# Patient Record
Sex: MALE | Race: WHITE | Employment: OTHER | URBAN - METROPOLITAN AREA
[De-identification: names, ages, dates, MRNs, and addresses within clinical notes are randomized per-mention and may not be internally consistent; named-entity substitution may affect disease eponyms.]

---

## 2017-03-17 ENCOUNTER — OFFICE VISIT (OUTPATIENT)
Dept: UROLOGY | Age: 67
End: 2017-03-17

## 2017-03-17 VITALS
DIASTOLIC BLOOD PRESSURE: 80 MMHG | BODY MASS INDEX: 24.25 KG/M2 | HEIGHT: 68 IN | SYSTOLIC BLOOD PRESSURE: 132 MMHG | HEART RATE: 62 BPM | WEIGHT: 160 LBS

## 2017-03-17 DIAGNOSIS — N40.0 BENIGN NON-NODULAR PROSTATIC HYPERPLASIA WITHOUT LOWER URINARY TRACT SYMPTOMS: ICD-10-CM

## 2017-03-17 DIAGNOSIS — R97.20 ELEVATED PSA: Primary | ICD-10-CM

## 2017-03-17 PROCEDURE — 99213 OFFICE O/P EST LOW 20 MIN: CPT | Performed by: UROLOGY

## 2017-03-17 ASSESSMENT — ENCOUNTER SYMPTOMS
ABDOMINAL PAIN: 0
SHORTNESS OF BREATH: 0
ABDOMINAL DISTENTION: 0

## 2017-09-25 DIAGNOSIS — R97.20 ELEVATED PSA: ICD-10-CM

## 2017-09-25 LAB — PROSTATE SPECIFIC ANTIGEN: 5.63 NG/ML (ref 0–5.4)

## 2017-09-28 ENCOUNTER — OFFICE VISIT (OUTPATIENT)
Dept: UROLOGY | Age: 67
End: 2017-09-28

## 2017-09-28 VITALS
BODY MASS INDEX: 24.25 KG/M2 | HEART RATE: 63 BPM | WEIGHT: 160 LBS | SYSTOLIC BLOOD PRESSURE: 136 MMHG | DIASTOLIC BLOOD PRESSURE: 84 MMHG | HEIGHT: 68 IN

## 2017-09-28 DIAGNOSIS — N13.8 BPH WITH OBSTRUCTION/LOWER URINARY TRACT SYMPTOMS: ICD-10-CM

## 2017-09-28 DIAGNOSIS — N40.1 BPH WITH OBSTRUCTION/LOWER URINARY TRACT SYMPTOMS: ICD-10-CM

## 2017-09-28 DIAGNOSIS — R97.20 ELEVATED PSA: Primary | ICD-10-CM

## 2017-09-28 PROCEDURE — 99213 OFFICE O/P EST LOW 20 MIN: CPT | Performed by: UROLOGY

## 2017-09-28 ASSESSMENT — ENCOUNTER SYMPTOMS: SHORTNESS OF BREATH: 0

## 2017-12-27 ENCOUNTER — OFFICE VISIT (OUTPATIENT)
Dept: FAMILY MEDICINE CLINIC | Age: 67
End: 2017-12-27

## 2017-12-27 VITALS
TEMPERATURE: 97.8 F | DIASTOLIC BLOOD PRESSURE: 80 MMHG | RESPIRATION RATE: 12 BRPM | SYSTOLIC BLOOD PRESSURE: 140 MMHG | HEART RATE: 80 BPM | WEIGHT: 169 LBS | BODY MASS INDEX: 25.61 KG/M2 | HEIGHT: 68 IN

## 2017-12-27 DIAGNOSIS — J01.90 ACUTE NON-RECURRENT SINUSITIS, UNSPECIFIED LOCATION: Primary | ICD-10-CM

## 2017-12-27 PROCEDURE — 99213 OFFICE O/P EST LOW 20 MIN: CPT | Performed by: FAMILY MEDICINE

## 2017-12-27 RX ORDER — DOXYCYCLINE HYCLATE 100 MG
100 TABLET ORAL 2 TIMES DAILY
Qty: 30 TABLET | Refills: 0 | Status: SHIPPED | OUTPATIENT
Start: 2017-12-27 | End: 2018-01-11

## 2017-12-27 ASSESSMENT — PATIENT HEALTH QUESTIONNAIRE - PHQ9
SUM OF ALL RESPONSES TO PHQ9 QUESTIONS 1 & 2: 0
1. LITTLE INTEREST OR PLEASURE IN DOING THINGS: 0
2. FEELING DOWN, DEPRESSED OR HOPELESS: 0
SUM OF ALL RESPONSES TO PHQ QUESTIONS 1-9: 0

## 2017-12-27 ASSESSMENT — ENCOUNTER SYMPTOMS
HEMOPTYSIS: 0
SHORTNESS OF BREATH: 0
SINUS PRESSURE: 1
RHINORRHEA: 1
SORE THROAT: 1
GASTROINTESTINAL NEGATIVE: 1
WHEEZING: 0
COUGH: 1
HEARTBURN: 0

## 2017-12-27 NOTE — PROGRESS NOTES
Subjective  Boaz Hodges, 78 y.o. male presents today with:  Chief Complaint   Patient presents with    URI     x 3 days , also pt complains of lump on left forearm     Mass       Cough   This is a new problem. The current episode started in the past 7 days. The problem has been unchanged. The problem occurs constantly. The cough is productive of sputum. Associated symptoms include ear congestion, headaches, myalgias, nasal congestion, postnasal drip, rhinorrhea and a sore throat. Pertinent negatives include no chest pain, chills, ear pain, fever, heartburn, hemoptysis, rash, shortness of breath, sweats, weight loss or wheezing. Nothing aggravates the symptoms. He has tried nothing for the symptoms. The treatment provided no relief. There is no history of asthma, bronchiectasis, bronchitis, COPD, emphysema, environmental allergies or pneumonia. Review of Systems   Constitutional: Negative. Negative for chills, fever and weight loss. HENT: Positive for congestion, postnasal drip, rhinorrhea, sinus pressure and sore throat. Negative for ear pain. Respiratory: Positive for cough. Negative for hemoptysis, shortness of breath and wheezing. Cardiovascular: Negative for chest pain. Gastrointestinal: Negative. Negative for heartburn. Genitourinary: Negative. Musculoskeletal: Positive for myalgias. Skin: Negative for rash. Allergic/Immunologic: Negative for environmental allergies. Neurological: Positive for headaches. Psychiatric/Behavioral: Negative. No past medical history on file. Past Surgical History:   Procedure Laterality Date    PROSTATE BIOPSY      x2     Social History     Social History    Marital status:      Spouse name: N/A    Number of children: N/A    Years of education: N/A     Occupational History    Not on file.      Social History Main Topics    Smoking status: Never Smoker    Smokeless tobacco: Never Used    Alcohol use 0.0 oz/week    Drug use:

## 2018-04-09 DIAGNOSIS — R97.20 ELEVATED PSA: ICD-10-CM

## 2018-04-09 LAB — PROSTATE SPECIFIC ANTIGEN: 5.33 NG/ML (ref 0–5.4)

## 2018-04-10 ENCOUNTER — OFFICE VISIT (OUTPATIENT)
Dept: UROLOGY | Age: 68
End: 2018-04-10
Payer: MEDICARE

## 2018-04-10 VITALS
SYSTOLIC BLOOD PRESSURE: 130 MMHG | HEART RATE: 64 BPM | BODY MASS INDEX: 24.25 KG/M2 | WEIGHT: 160 LBS | HEIGHT: 68 IN | DIASTOLIC BLOOD PRESSURE: 80 MMHG

## 2018-04-10 DIAGNOSIS — N40.0 BPH WITHOUT OBSTRUCTION/LOWER URINARY TRACT SYMPTOMS: ICD-10-CM

## 2018-04-10 DIAGNOSIS — R97.20 ELEVATED PSA: Primary | ICD-10-CM

## 2018-04-10 PROCEDURE — 99213 OFFICE O/P EST LOW 20 MIN: CPT | Performed by: UROLOGY

## 2018-04-10 ASSESSMENT — ENCOUNTER SYMPTOMS
ABDOMINAL PAIN: 0
ABDOMINAL DISTENTION: 0

## 2018-08-14 ENCOUNTER — OFFICE VISIT (OUTPATIENT)
Dept: FAMILY MEDICINE CLINIC | Age: 68
End: 2018-08-14
Payer: MEDICARE

## 2018-08-14 VITALS
WEIGHT: 164 LBS | BODY MASS INDEX: 24.86 KG/M2 | RESPIRATION RATE: 16 BRPM | DIASTOLIC BLOOD PRESSURE: 68 MMHG | HEIGHT: 68 IN | TEMPERATURE: 98 F | SYSTOLIC BLOOD PRESSURE: 122 MMHG | HEART RATE: 69 BPM | OXYGEN SATURATION: 98 %

## 2018-08-14 DIAGNOSIS — B96.89 ACUTE BACTERIAL SINUSITIS: Primary | ICD-10-CM

## 2018-08-14 DIAGNOSIS — J01.90 ACUTE BACTERIAL SINUSITIS: Primary | ICD-10-CM

## 2018-08-14 PROCEDURE — 99213 OFFICE O/P EST LOW 20 MIN: CPT | Performed by: INTERNAL MEDICINE

## 2018-08-14 RX ORDER — FLUTICASONE PROPIONATE 50 MCG
1 SPRAY, SUSPENSION (ML) NASAL DAILY
Qty: 1 BOTTLE | Refills: 0 | Status: SHIPPED | OUTPATIENT
Start: 2018-08-14 | End: 2019-05-03 | Stop reason: ALTCHOICE

## 2018-08-14 RX ORDER — AMOXICILLIN AND CLAVULANATE POTASSIUM 875; 125 MG/1; MG/1
1 TABLET, FILM COATED ORAL 2 TIMES DAILY
Qty: 14 TABLET | Refills: 0 | Status: SHIPPED | OUTPATIENT
Start: 2018-08-14 | End: 2018-08-21

## 2018-08-14 ASSESSMENT — ENCOUNTER SYMPTOMS
SINUS PAIN: 1
EYE PAIN: 0
SHORTNESS OF BREATH: 0
BACK PAIN: 0
ABDOMINAL PAIN: 0
SINUS PRESSURE: 1
COUGH: 1

## 2018-08-14 NOTE — PROGRESS NOTES
Subjective:      Patient ID: Ramonita Daniels is a 78 y.o. male who presents today with:  Chief Complaint   Patient presents with    Sinus Problem     sinus issues since mid June and sinus headache    Congestion     congestion        HPI    2 Month history of \"stuffy\" nose, light headed, dry cough, can't breath out of nostrils. He mentions he swims a lot. Also has sinus pain and pressure. He hasn't been treated at all up to this point. Mild/annoying symptoms. Non productive cough. History reviewed. No pertinent past medical history. Past Surgical History:   Procedure Laterality Date    PROSTATE BIOPSY      x2     Social History     Social History    Marital status:      Spouse name: N/A    Number of children: N/A    Years of education: N/A     Occupational History    Not on file. Social History Main Topics    Smoking status: Never Smoker    Smokeless tobacco: Never Used    Alcohol use 0.0 oz/week    Drug use: No    Sexual activity: Not on file     Other Topics Concern    Not on file     Social History Narrative    No narrative on file     Allergies   Allergen Reactions    Bactrim [Sulfamethoxazole-Trimethoprim] Hives     Patient stated got hives, itching after taking Bactrim     No current outpatient prescriptions on file prior to visit. No current facility-administered medications on file prior to visit. I have personally reviewed the ROS, PMH, PFH, and social history     Review of Systems   Constitutional: Negative for chills. HENT: Positive for sinus pain and sinus pressure. Negative for congestion. Eyes: Negative for pain. Respiratory: Positive for cough (Nonproductive ). Negative for shortness of breath. Cardiovascular: Negative for chest pain. Gastrointestinal: Negative for abdominal pain. Genitourinary: Negative for hematuria. Musculoskeletal: Negative for back pain. Allergic/Immunologic: Negative for immunocompromised state.    Neurological: Negative for headaches. Psychiatric/Behavioral: Negative for hallucinations. Objective:   /68 (Site: Left Arm, Position: Sitting, Cuff Size: Large Adult)   Pulse 69   Temp 98 °F (36.7 °C) (Tympanic)   Resp 16   Ht 5' 8\" (1.727 m)   Wt 164 lb (74.4 kg)   SpO2 98%   BMI 24.94 kg/m²     Physical Exam   Constitutional: He is oriented to person, place, and time. He appears well-developed and well-nourished. HENT:   Head: Normocephalic. Right Ear: External ear normal.   Left Ear: External ear normal.   No facial edema    Eyes: Pupils are equal, round, and reactive to light. Neck: No tracheal deviation present. Cardiovascular: Normal rate, regular rhythm and normal heart sounds. Exam reveals no gallop and no friction rub. No murmur heard. Pulmonary/Chest: No respiratory distress. He has no rales. Abdominal: Soft. Bowel sounds are normal. He exhibits no distension. There is no rebound. Musculoskeletal: He exhibits no edema. Neurological: He is oriented to person, place, and time. Skin: Skin is warm and dry. Assessment:       Diagnosis Orders   1. Acute bacterial sinusitis  amoxicillin-clavulanate (AUGMENTIN) 875-125 MG per tablet    fluticasone (FLONASE) 50 MCG/ACT nasal spray           Plan:   Orders per below  Take mucinex and saline spray as well, if this fails add Netipot   If not better after treatment follow back up. Explained risk of worsening infection, and if it should progress despite antibiotics to call 911 immediately. Explained risk of sepsis, amputation, death, etc.     The patient was reminded that an antibiotic has been prescribed the predicted course is improvement to cure with no persistent issues. Take antibiotics as directed. If any problems occur, an appointment should be made or ER visit if severe. Because of the risk with ANY antibiotic of C. Difficile colitis if persistent diarrhea or abdominal pain or any concerning symptoms, we should be notified.   To reduce

## 2018-08-14 NOTE — PATIENT INSTRUCTIONS

## 2018-10-22 ENCOUNTER — TELEPHONE (OUTPATIENT)
Dept: UROLOGY | Age: 68
End: 2018-10-22

## 2018-10-22 DIAGNOSIS — R97.20 ELEVATED PSA: Primary | ICD-10-CM

## 2018-10-23 DIAGNOSIS — R97.20 ELEVATED PSA: ICD-10-CM

## 2018-10-23 LAB — PROSTATE SPECIFIC ANTIGEN: 4.91 NG/ML (ref 0–5.4)

## 2018-10-25 ENCOUNTER — OFFICE VISIT (OUTPATIENT)
Dept: UROLOGY | Age: 68
End: 2018-10-25
Payer: MEDICARE

## 2018-10-25 VITALS
DIASTOLIC BLOOD PRESSURE: 72 MMHG | HEIGHT: 68 IN | BODY MASS INDEX: 24.25 KG/M2 | WEIGHT: 160 LBS | HEART RATE: 79 BPM | SYSTOLIC BLOOD PRESSURE: 132 MMHG

## 2018-10-25 DIAGNOSIS — R97.20 ELEVATED PSA: Primary | ICD-10-CM

## 2018-10-25 PROCEDURE — 99213 OFFICE O/P EST LOW 20 MIN: CPT | Performed by: UROLOGY

## 2018-10-25 ASSESSMENT — ENCOUNTER SYMPTOMS: SHORTNESS OF BREATH: 0

## 2018-10-25 NOTE — PROGRESS NOTES
Subjective:      Patient ID: Yadira Kim is a 80 y.o. male. HPI This is a 77 yo white male with h/o BPH without LUTs and elevated PSA's and prior episodes of prostatitis back in follow-up. Since last seen on 4/10/18, he denies interval prostatitis (used Levaquin in past). He has no hematuria or dysuria and no frequency or urgency or UI. He tried Flomax in the past and got dizzy and so stopped the medication. He has no interval medical or surgical problems. I reviewed the interval PSA today.      Trus/Prostate biopsy 6/2012: neg, PV 31.5 cc  (PSA 7.19 ng/ml)  Trus/Prostate biopsy 11/2010: one focus of HGPIN, others neg   PCA3: neg, (10.7)    History reviewed. No pertinent past medical history. Past Surgical History:   Procedure Laterality Date    PROSTATE BIOPSY      x2     Social History     Social History    Marital status:      Spouse name: N/A    Number of children: N/A    Years of education: N/A     Social History Main Topics    Smoking status: Never Smoker    Smokeless tobacco: Never Used    Alcohol use 0.0 oz/week    Drug use: No    Sexual activity: Not Asked     Other Topics Concern    None     Social History Narrative    None     Family History   Problem Relation Age of Onset    High Blood Pressure Mother     Cancer Mother         ovarian    High Blood Pressure Father     Emphysema Father     Cancer Father         prostate     Current Outpatient Prescriptions   Medication Sig Dispense Refill    fluticasone (FLONASE) 50 MCG/ACT nasal spray 1 spray by Nasal route daily 1 Bottle 0     No current facility-administered medications for this visit. Bactrim [sulfamethoxazole-trimethoprim]  reviewed      Review of Systems   Constitutional: Negative for fever and unexpected weight change. Respiratory: Negative for shortness of breath. Cardiovascular: Negative for chest pain. Genitourinary: Negative for difficulty urinating, dysuria, flank pain and hematuria.        Objective: Physical Exam   Constitutional: He appears well-developed and well-nourished. Genitourinary: Rectal exam shows no external hemorrhoid. Prostate is enlarged. Prostate is not tender. Genitourinary Comments: Prostate has no nodules   Neurological: He is alert. Psychiatric: He has a normal mood and affect. His behavior is normal.     PSA   Date Value Ref Range Status   10/23/2018 4.91 0.00 - 5.40 ng/mL Final   04/09/2018 5.33 0.00 - 5.40 ng/mL Final   09/25/2017 5.63 (H) 0.00 - 5.40 ng/mL Final   03/10/2017 5.50 (H) 0.00 - 5.40 ng/mL Corrected   09/01/2016 5.54 (H) 0.00 - 5.40 ng/mL Final     Diagnostic Psa   Date Value Ref Range Status   07/30/2014 4.19 0.00 - 5.40 ng/mL Final   07/05/2013 4.3 (H) 0.0 - 4.0 ng/mL Final       Assessment: This is a 79 yo white male with h/o BPH with mild LUTs (stable) and h/o elevated PSA's (2 prior biopsies negative and PCA3 normal) and with an elevated but relatively stable PSA. The option of another prostate biopsy vs continued closer PSA observation was again discussed. I also discussed and recommended he consider a prostate MRI and if abnl a fusion biopsy and he agrees. and he will continue with closer PSA follow-up. He understands the approx 25% risk for prostate cancer based on his current PSA. Plan:      1. F/U 6 mo for PSA  2.  Refer to [facility] for prostate MRI        Hali Esteves MD

## 2019-01-29 ENCOUNTER — OFFICE VISIT (OUTPATIENT)
Dept: FAMILY MEDICINE CLINIC | Age: 69
End: 2019-01-29
Payer: MEDICARE

## 2019-01-29 VITALS
DIASTOLIC BLOOD PRESSURE: 70 MMHG | HEART RATE: 74 BPM | OXYGEN SATURATION: 98 % | TEMPERATURE: 98.5 F | SYSTOLIC BLOOD PRESSURE: 142 MMHG | WEIGHT: 165 LBS | RESPIRATION RATE: 12 BRPM | HEIGHT: 68 IN | BODY MASS INDEX: 25.01 KG/M2

## 2019-01-29 DIAGNOSIS — M79.662 PAIN OF LEFT CALF: ICD-10-CM

## 2019-01-29 DIAGNOSIS — J01.90 ACUTE NON-RECURRENT SINUSITIS, UNSPECIFIED LOCATION: Primary | ICD-10-CM

## 2019-01-29 PROCEDURE — 3288F FALL RISK ASSESSMENT DOCD: CPT | Performed by: FAMILY MEDICINE

## 2019-01-29 PROCEDURE — 99213 OFFICE O/P EST LOW 20 MIN: CPT | Performed by: FAMILY MEDICINE

## 2019-01-29 PROCEDURE — G8510 SCR DEP NEG, NO PLAN REQD: HCPCS | Performed by: FAMILY MEDICINE

## 2019-01-29 RX ORDER — DOXYCYCLINE HYCLATE 100 MG
100 TABLET ORAL 2 TIMES DAILY
Qty: 20 TABLET | Refills: 0 | Status: SHIPPED | OUTPATIENT
Start: 2019-01-29 | End: 2019-02-08

## 2019-01-29 ASSESSMENT — ENCOUNTER SYMPTOMS
SWOLLEN GLANDS: 1
CHANGE IN BOWEL HABIT: 0
COUGH: 0
SORE THROAT: 1
VISUAL CHANGE: 0
RHINORRHEA: 1
RESPIRATORY NEGATIVE: 1
VOMITING: 0
NAUSEA: 0
GASTROINTESTINAL NEGATIVE: 1
SINUS PRESSURE: 1
VOICE CHANGE: 1
ABDOMINAL PAIN: 0

## 2019-01-29 ASSESSMENT — PATIENT HEALTH QUESTIONNAIRE - PHQ9
1. LITTLE INTEREST OR PLEASURE IN DOING THINGS: 0
SUM OF ALL RESPONSES TO PHQ9 QUESTIONS 1 & 2: 0
DEPRESSION UNABLE TO ASSESS: FUNCTIONAL CAPACITY MOTIVATION LIMITS ACCURACY
SUM OF ALL RESPONSES TO PHQ QUESTIONS 1-9: 0
2. FEELING DOWN, DEPRESSED OR HOPELESS: 0
SUM OF ALL RESPONSES TO PHQ QUESTIONS 1-9: 0

## 2019-01-30 ENCOUNTER — HOSPITAL ENCOUNTER (OUTPATIENT)
Dept: ULTRASOUND IMAGING | Age: 69
Discharge: HOME | End: 2019-02-01
Payer: MEDICARE

## 2019-01-30 DIAGNOSIS — M79.662 PAIN OF LEFT CALF: ICD-10-CM

## 2019-01-30 PROCEDURE — 93971 EXTREMITY STUDY: CPT

## 2019-03-27 ENCOUNTER — TELEPHONE (OUTPATIENT)
Dept: FAMILY MEDICINE CLINIC | Age: 69
End: 2019-03-27

## 2019-04-29 DIAGNOSIS — R97.20 ELEVATED PSA: Primary | ICD-10-CM

## 2019-04-29 DIAGNOSIS — R97.20 ELEVATED PSA: ICD-10-CM

## 2019-04-29 LAB — PROSTATE SPECIFIC ANTIGEN: 6.91 NG/ML (ref 0–5.4)

## 2019-05-03 ENCOUNTER — OFFICE VISIT (OUTPATIENT)
Dept: UROLOGY | Age: 69
End: 2019-05-03
Payer: MEDICARE

## 2019-05-03 VITALS
WEIGHT: 162 LBS | BODY MASS INDEX: 24.55 KG/M2 | SYSTOLIC BLOOD PRESSURE: 138 MMHG | HEART RATE: 62 BPM | HEIGHT: 68 IN | DIASTOLIC BLOOD PRESSURE: 84 MMHG

## 2019-05-03 DIAGNOSIS — R97.20 ELEVATED PSA: Primary | ICD-10-CM

## 2019-05-03 PROCEDURE — 99213 OFFICE O/P EST LOW 20 MIN: CPT | Performed by: UROLOGY

## 2019-05-03 RX ORDER — ASCORBIC ACID 500 MG
500 TABLET ORAL DAILY
COMMUNITY

## 2019-05-03 ASSESSMENT — ENCOUNTER SYMPTOMS
ABDOMINAL PAIN: 0
ABDOMINAL DISTENTION: 0

## 2019-05-03 NOTE — PROGRESS NOTES
Subjective:      Patient ID: Dee Dee Lua is a 80 y.o. male. HPI This is a 79 yo white male with h/o BPH without LUTs and elevated PSA's and prior prostatitis back in follow-up. Since last seen on 10/25/18, he was seen at [facility] and had a \"negative\" prostate MRI and was told he did not need a biopsy. Since last seen, he denies interval prostatitis (used Levaquin in past). He has no hematuria or dysuria. He has no frequency or urgency or UI. He tried Flomax in the past and got dizzy and so stopped the medication. He has no interval medical or surgical problems. I reviewed the interval PSA today. I reviewed the interval medical records from [facility] today.      Trus/Prostate biopsy 6/2012: neg, PV 31.5 cc  (PSA 7.19 ng/ml)  Trus/Prostate biopsy 11/2010: one focus of HGPIN, others neg   PCA3: neg, (10.7)    History reviewed. No pertinent past medical history. Past Surgical History:   Procedure Laterality Date    PROSTATE BIOPSY      x2     Social History     Socioeconomic History    Marital status:      Spouse name: None    Number of children: None    Years of education: None    Highest education level: None   Occupational History    None   Social Needs    Financial resource strain: None    Food insecurity:     Worry: None     Inability: None    Transportation needs:     Medical: None     Non-medical: None   Tobacco Use    Smoking status: Never Smoker    Smokeless tobacco: Never Used   Substance and Sexual Activity    Alcohol use:  Yes     Alcohol/week: 0.0 oz    Drug use: No    Sexual activity: None   Lifestyle    Physical activity:     Days per week: None     Minutes per session: None    Stress: None   Relationships    Social connections:     Talks on phone: None     Gets together: None     Attends [employer] service: None     Active member of club or organization: None     Attends meetings of clubs or organizations: None     Relationship status: None    Intimate partner violence:     Fear of current on his current PSA. Plan:      1.  F/U 6 mo for PSA (pt choice)        Haritha Drake MD

## 2019-07-11 ENCOUNTER — OFFICE VISIT (OUTPATIENT)
Dept: FAMILY MEDICINE CLINIC | Age: 69
End: 2019-07-11
Payer: MEDICARE

## 2019-07-11 VITALS
OXYGEN SATURATION: 98 % | RESPIRATION RATE: 15 BRPM | SYSTOLIC BLOOD PRESSURE: 136 MMHG | TEMPERATURE: 98.7 F | HEART RATE: 82 BPM | HEIGHT: 68 IN | DIASTOLIC BLOOD PRESSURE: 84 MMHG | BODY MASS INDEX: 24.1 KG/M2 | WEIGHT: 159 LBS

## 2019-07-11 DIAGNOSIS — E78.5 HYPERLIPIDEMIA, UNSPECIFIED HYPERLIPIDEMIA TYPE: ICD-10-CM

## 2019-07-11 DIAGNOSIS — K11.5 SIALOLITHIASIS: Primary | ICD-10-CM

## 2019-07-11 DIAGNOSIS — R97.20 ELEVATED PSA: ICD-10-CM

## 2019-07-11 DIAGNOSIS — R13.10 DYSPHAGIA, UNSPECIFIED TYPE: ICD-10-CM

## 2019-07-11 DIAGNOSIS — R03.0 ELEVATED BP WITHOUT DIAGNOSIS OF HYPERTENSION: ICD-10-CM

## 2019-07-11 PROCEDURE — 99214 OFFICE O/P EST MOD 30 MIN: CPT | Performed by: INTERNAL MEDICINE

## 2019-07-11 RX ORDER — AMOXICILLIN AND CLAVULANATE POTASSIUM 875; 125 MG/1; MG/1
1 TABLET, FILM COATED ORAL 2 TIMES DAILY
Qty: 14 TABLET | Refills: 0 | Status: SHIPPED | OUTPATIENT
Start: 2019-07-11 | End: 2019-07-18

## 2019-07-11 ASSESSMENT — ENCOUNTER SYMPTOMS
BACK PAIN: 0
ABDOMINAL PAIN: 0
EYE PAIN: 0
SHORTNESS OF BREATH: 0

## 2019-07-11 NOTE — PATIENT INSTRUCTIONS
Patient Education        Salivary Gland Stone: Care Instructions  Your Care Instructions    Salivary glands make saliva, or spit. A salivary gland stone is a piece of hard material, usually calcium, that can form in any of the three main salivary glands in the mouth. Salivary gland stones are also called salivary duct stones. Stones form most often in the gland that releases saliva below the tongue. A stone can block saliva from flowing out of the gland. When saliva backs up behind the stone, it can make the gland swell. The gland swells while you are eating, and then the swelling goes down slowly afterward. The swelling and pain may be under the jaw or in the area in front of the ear, depending on which gland is affected. Your doctor will ask you about your symptoms. He or she may be able to see and feel the gland under your skin or in the floor of your mouth. You may get an imaging test, such as a CT scan or ultrasound. This will help your doctor know if you have a stone and not some other problem. Most stones come out into the mouth on their own. While the stone is in the gland, your doctor may have you take medicine for pain. There are also some things you can do at home to help move the stone. If the stone in your gland hasn't come out within a few weeks, your doctor will discuss treatment options with you. Follow-up care is a key part of your treatment and safety. Be sure to make and go to all appointments, and call your doctor if you are having problems. It's also a good idea to know your test results and keep a list of the medicines you take. How can you care for yourself at home? · Be safe with medicines. Read and follow all instructions on the label. ? If the doctor gave you a prescription medicine for pain, take it as prescribed. ? If you are not taking a prescription pain medicine, ask your doctor if you can take an over-the-counter medicine.   · If your doctor prescribed antibiotics, take them

## 2019-07-11 NOTE — PROGRESS NOTES
of clubs or organizations: Not on file     Relationship status: Not on file    Intimate partner violence:     Fear of current or ex partner: Not on file     Emotionally abused: Not on file     Physically abused: Not on file     Forced sexual activity: Not on file   Other Topics Concern    Not on file   Social History Narrative    Not on file     Allergies   Allergen Reactions    Bactrim [Sulfamethoxazole-Trimethoprim] Hives     Patient stated got hives, itching after taking Bactrim     Current Outpatient Medications on File Prior to Visit   Medication Sig Dispense Refill    vitamin C (ASCORBIC ACID) 500 MG tablet Take 500 mg by mouth daily       No current facility-administered medications on file prior to visit. I have personally reviewed the ROS, PMH, PFH, and social history     Review of Systems   Constitutional: Negative for chills. HENT: Negative for congestion. Area in left side of jaw is hard to touch    Eyes: Negative for pain. Respiratory: Negative for shortness of breath. Cardiovascular: Negative for chest pain. Gastrointestinal: Negative for abdominal pain. Genitourinary: Negative for hematuria. Musculoskeletal: Negative for back pain. Allergic/Immunologic: Negative for immunocompromised state. Neurological: Negative for headaches. Psychiatric/Behavioral: Negative for hallucinations. Objective:   /84 (Site: Right Upper Arm, Position: Sitting, Cuff Size: Large Adult)   Pulse 82   Temp 98.7 °F (37.1 °C) (Tympanic)   Resp 15   Ht 5' 8\" (1.727 m)   Wt 159 lb (72.1 kg)   SpO2 98%   BMI 24.18 kg/m²     Physical Exam   Constitutional: He is oriented to person, place, and time. He appears well-developed and well-nourished. HENT:   Head: Normocephalic. Palpable \"pea\" sized firm but mobile over left submental gland    Eyes: Pupils are equal, round, and reactive to light. Neck: No tracheal deviation present.    Cardiovascular: Normal rate, regular rhythm Standing Status:   Future     Standing Expiration Date:   7/11/2020    AFL - Lorena Pickering MD, Otolaryngology, [facility]     Referral Priority:   Routine     Referral Type:   Eval and Treat     Referral Reason:   Specialty Services Required     Referred to Provider:   Marisel Pink MD     Requested Specialty:   Otolaryngology     Number of Visits Requested:   Rocio Fabian MD, Gastroenterology, [facility]     Referral Priority:   Routine     Referral Type:   Eval and Treat     Referral Reason:   Specialty Services Required     Referred to Provider:   Erick Heaton MD     Requested Specialty:   Gastroenterology     Number of Visits Requested:   1     Orders Placed This Encounter   Medications    amoxicillin-clavulanate (AUGMENTIN) 875-125 MG per tablet     Sig: Take 1 tablet by mouth 2 times daily for 7 days     Dispense:  14 tablet     Refill:  0       Return in about 6 months (around 1/11/2020) for regularly scheduled appointment with PCP, worsening symptoms, call ASAP for appointment. Preethi Baca MD    If anything should change or worsen call ASAP, don't wait for next scheduled appointment.

## 2019-08-13 DIAGNOSIS — K11.5 SIALOLITHIASIS: ICD-10-CM

## 2019-08-13 DIAGNOSIS — R03.0 ELEVATED BP WITHOUT DIAGNOSIS OF HYPERTENSION: ICD-10-CM

## 2019-08-13 DIAGNOSIS — E78.5 HYPERLIPIDEMIA, UNSPECIFIED HYPERLIPIDEMIA TYPE: ICD-10-CM

## 2019-08-13 DIAGNOSIS — R13.10 DYSPHAGIA, UNSPECIFIED TYPE: ICD-10-CM

## 2019-08-13 LAB
ALBUMIN SERPL-MCNC: 4.2 G/DL (ref 3.5–4.6)
ALP BLD-CCNC: 73 U/L (ref 35–104)
ALT SERPL-CCNC: 17 U/L (ref 0–41)
ANION GAP SERPL CALCULATED.3IONS-SCNC: 14 MEQ/L (ref 9–15)
AST SERPL-CCNC: 23 U/L (ref 0–40)
BASOPHILS ABSOLUTE: 0.1 K/UL (ref 0–0.2)
BASOPHILS RELATIVE PERCENT: 2 %
BILIRUB SERPL-MCNC: 0.5 MG/DL (ref 0.2–0.7)
BUN BLDV-MCNC: 15 MG/DL (ref 8–23)
CALCIUM SERPL-MCNC: 9.3 MG/DL (ref 8.5–9.9)
CHLORIDE BLD-SCNC: 102 MEQ/L (ref 95–107)
CHOLESTEROL, TOTAL: 204 MG/DL (ref 0–199)
CO2: 24 MEQ/L (ref 20–31)
CREAT SERPL-MCNC: 0.8 MG/DL (ref 0.7–1.2)
EOSINOPHILS ABSOLUTE: 0.2 K/UL (ref 0–0.7)
EOSINOPHILS RELATIVE PERCENT: 3.7 %
GFR AFRICAN AMERICAN: >60
GFR NON-AFRICAN AMERICAN: >60
GLOBULIN: 2.8 G/DL (ref 2.3–3.5)
GLUCOSE BLD-MCNC: 79 MG/DL (ref 70–99)
HCT VFR BLD CALC: 42.6 % (ref 42–52)
HDLC SERPL-MCNC: 57 MG/DL (ref 40–59)
HEMOGLOBIN: 14.6 G/DL (ref 14–18)
LDL CHOLESTEROL CALCULATED: 119 MG/DL (ref 0–129)
LYMPHOCYTES ABSOLUTE: 1.4 K/UL (ref 1–4.8)
LYMPHOCYTES RELATIVE PERCENT: 22.8 %
MCH RBC QN AUTO: 30.8 PG (ref 27–31.3)
MCHC RBC AUTO-ENTMCNC: 34.1 % (ref 33–37)
MCV RBC AUTO: 90.2 FL (ref 80–100)
MONOCYTES ABSOLUTE: 0.5 K/UL (ref 0.2–0.8)
MONOCYTES RELATIVE PERCENT: 8.4 %
NEUTROPHILS ABSOLUTE: 3.9 K/UL (ref 1.4–6.5)
NEUTROPHILS RELATIVE PERCENT: 63.1 %
PDW BLD-RTO: 14.3 % (ref 11.5–14.5)
PLATELET # BLD: 324 K/UL (ref 130–400)
POTASSIUM SERPL-SCNC: 4.8 MEQ/L (ref 3.4–4.9)
RBC # BLD: 4.73 M/UL (ref 4.7–6.1)
SODIUM BLD-SCNC: 140 MEQ/L (ref 135–144)
TOTAL PROTEIN: 7 G/DL (ref 6.3–8)
TRIGL SERPL-MCNC: 139 MG/DL (ref 0–150)
TSH REFLEX: 1.21 UIU/ML (ref 0.44–3.86)
WBC # BLD: 6.2 K/UL (ref 4.8–10.8)

## 2019-08-20 ENCOUNTER — OFFICE VISIT (OUTPATIENT)
Dept: FAMILY MEDICINE CLINIC | Age: 69
End: 2019-08-20
Payer: MEDICARE

## 2019-08-20 VITALS
WEIGHT: 158 LBS | SYSTOLIC BLOOD PRESSURE: 118 MMHG | OXYGEN SATURATION: 97 % | TEMPERATURE: 98.6 F | HEIGHT: 68 IN | BODY MASS INDEX: 23.95 KG/M2 | DIASTOLIC BLOOD PRESSURE: 78 MMHG | RESPIRATION RATE: 15 BRPM | HEART RATE: 80 BPM

## 2019-08-20 DIAGNOSIS — K11.5 SIALOLITHIASIS: ICD-10-CM

## 2019-08-20 DIAGNOSIS — R13.10 DYSPHAGIA, UNSPECIFIED TYPE: Primary | ICD-10-CM

## 2019-08-20 DIAGNOSIS — E78.5 HYPERLIPIDEMIA, UNSPECIFIED HYPERLIPIDEMIA TYPE: ICD-10-CM

## 2019-08-20 DIAGNOSIS — Z23 ENCOUNTER FOR IMMUNIZATION: ICD-10-CM

## 2019-08-20 DIAGNOSIS — E55.9 VITAMIN D DEFICIENCY: ICD-10-CM

## 2019-08-20 PROCEDURE — 99214 OFFICE O/P EST MOD 30 MIN: CPT | Performed by: INTERNAL MEDICINE

## 2019-08-20 ASSESSMENT — ENCOUNTER SYMPTOMS
EYE PAIN: 0
SHORTNESS OF BREATH: 0
BACK PAIN: 0
ABDOMINAL PAIN: 0

## 2019-08-20 NOTE — PROGRESS NOTES
on file   Social History Narrative    Not on file     Allergies   Allergen Reactions    Bactrim [Sulfamethoxazole-Trimethoprim] Hives     Patient stated got hives, itching after taking Bactrim     Current Outpatient Medications on File Prior to Visit   Medication Sig Dispense Refill    vitamin C (ASCORBIC ACID) 500 MG tablet Take 500 mg by mouth daily       No current facility-administered medications on file prior to visit. I have personally reviewed the ROS, PMH, PFH, and social history     Review of Systems   Constitutional: Negative for chills. HENT: Negative for congestion. Eyes: Negative for pain. Respiratory: Negative for shortness of breath. Cardiovascular: Negative for chest pain. Gastrointestinal: Negative for abdominal pain. Improving dysphagia    Genitourinary: Negative for hematuria. Musculoskeletal: Negative for back pain. Allergic/Immunologic: Negative for immunocompromised state. Neurological: Negative for headaches. Psychiatric/Behavioral: Negative for hallucinations. Objective:   /78 (Site: Left Upper Arm, Position: Sitting, Cuff Size: Large Adult)   Pulse 80   Temp 98.6 °F (37 °C) (Tympanic)   Resp 15   Ht 5' 8\" (1.727 m)   Wt 158 lb (71.7 kg)   SpO2 97%   BMI 24.02 kg/m²     Physical Exam   Constitutional: He is oriented to person, place, and time. He appears well-developed and well-nourished. HENT:   Head: Normocephalic. Eyes: Pupils are equal, round, and reactive to light. Neck: No tracheal deviation present. Cardiovascular: Normal rate, regular rhythm and normal heart sounds. Exam reveals no gallop and no friction rub. No murmur heard. Pulmonary/Chest: No respiratory distress. Abdominal: Soft. Bowel sounds are normal. He exhibits no distension. There is no tenderness. There is no rebound and no guarding. Musculoskeletal: He exhibits no edema. Neurological: He is oriented to person, place, and time.    Skin: Skin is warm worsening symptoms, call ASAP for appointment, regularly scheduled appointment with PCP. Carlene Muñiz MD    If anything should change or worsen call ASAP, don't wait for next scheduled appointment.

## 2019-08-20 NOTE — PATIENT INSTRUCTIONS
doctor about all medications you use. This includes prescription, over-the-counter, vitamin, and herbal products. Do not start a new medication without telling your doctor. Where can I get more information? Your doctor or pharmacist can provide more information about this vaccine. Additional information is available from your local health department or the Centers for Disease Control and Prevention. Remember, keep this and all other medicines out of the reach of children, never share your medicines with others, and use this medication only for the indication prescribed. Every effort has been made to ensure that the information provided by [address] is accurate, up-to-date, and complete, but no guarantee is made to that effect. Drug information contained herein may be time sensitive. [facility] information has been compiled for use by healthcare practitioners and consumers in the [facility] and therefore [facility] does not warrant that uses outside of the [facility] are appropriate, unless specifically indicated otherwise. [facility]'s drug information does not endorse drugs, diagnose patients or recommend therapy. [facility]'s drug information is an informational resource designed to assist licensed healthcare practitioners in caring for their patients and/or to serve consumers viewing this service as a supplement to, and not a substitute for, the expertise, skill, knowledge and judgment of healthcare practitioners. The absence of a warning for a given drug or drug combination in no way should be construed to indicate that the drug or drug combination is safe, effective or appropriate for any given patient. [facility] does not assume any responsibility for any aspect of healthcare administered with the aid of information [facility] provides.  The information contained herein is not intended to cover all possible uses, directions, precautions, warnings, drug interactions, allergic reactions, or adverse effects. If you have questions about the drugs you are taking, check with your doctor, nurse or pharmacist.  Copyright 1151-9135 [address]. Version: 4.01. Revision date: 1/16/2012. Care instructions adapted under license by [facility] ([facility]). If you have questions about a medical condition or this instruction, always ask your healthcare professional. [place] [address] any warranty or liability for your use of this information.

## 2019-08-30 ENCOUNTER — OFFICE VISIT (OUTPATIENT)
Dept: GASTROENTEROLOGY | Age: 69
End: 2019-08-30
Payer: MEDICARE

## 2019-08-30 VITALS
DIASTOLIC BLOOD PRESSURE: 62 MMHG | HEIGHT: 68 IN | BODY MASS INDEX: 24.25 KG/M2 | OXYGEN SATURATION: 95 % | SYSTOLIC BLOOD PRESSURE: 124 MMHG | TEMPERATURE: 97 F | HEART RATE: 66 BPM | WEIGHT: 160 LBS

## 2019-08-30 DIAGNOSIS — K22.4 ESOPHAGEAL SPASM: ICD-10-CM

## 2019-08-30 DIAGNOSIS — R13.19 ESOPHAGEAL DYSPHAGIA: Primary | ICD-10-CM

## 2019-08-30 PROCEDURE — 99203 OFFICE O/P NEW LOW 30 MIN: CPT | Performed by: INTERNAL MEDICINE

## 2019-08-30 RX ORDER — OMEPRAZOLE 20 MG/1
20 CAPSULE, DELAYED RELEASE ORAL DAILY
Qty: 30 CAPSULE | Refills: 3 | Status: SHIPPED | OUTPATIENT
Start: 2019-08-30

## 2019-08-30 NOTE — PROGRESS NOTES
Gastroenterology Clinic Visit    Nila Sewell  [id number]  Chief Complaint   Patient presents with    Consultation     HPI: 80 y.o. male presents to the clinic with history of food getting stuck in the esophagus on a intermittent basis over the last many years. Patient reports history of upper endoscopy with dilation approximately 10 years ago. Patient denies any reflux symptoms. Patient reports the symptoms occurring about once or once every 2 weeks. Patient reports the symptoms occur when he tries to eat fast or after he has not eaten the entire day. He denies any episode where he has had to present to the hospital with food bolus obstruction. The events occurred in response to large dry meat pieces. He denies any progression of symptoms. He denies any hematemesis or melena. He has had 2 to 3 pounds of intentional weight loss. This is in response to patient exercising more and keeping active. Patient denies any other GI symptoms. He is a non-smoker, he quit alcohol many years ago. He does report to having worse symptoms when he was drinking beer. Denies any family history of esophageal issues. No overt seasonal allergies. Review of Systems   All other systems reviewed and are negative. History reviewed. No pertinent past medical history. Past Surgical History:   Procedure Laterality Date    PROSTATE BIOPSY      x2     Current Outpatient Medications on File Prior to Visit   Medication Sig Dispense Refill    vitamin C (ASCORBIC ACID) 500 MG tablet Take 500 mg by mouth daily       No current facility-administered medications on file prior to visit.       Family History   Problem Relation Age of Onset    High Blood Pressure Mother     Cancer Mother         ovarian    High Blood Pressure Father     Emphysema Father     Cancer Father         prostate    Colon Cancer Neg Hx       Social History     Socioeconomic History    Marital status:      Spouse name: None    Number of normal. Judgment and thought content normal.     Laboratory, Pathology, Radiology reviewed indetail with relevant important investigations summarized below:  Lab Results   Component Value Date    WBC 6.2 08/13/2019    HGB 14.6 08/13/2019    HCT 42.6 08/13/2019    MCV 90.2 08/13/2019     08/13/2019     Lab Results   Component Value Date    ALT 17 08/13/2019    AST 23 08/13/2019    ALKPHOS 73 08/13/2019    BILITOT 0.5 08/13/2019     Endoscopic investigations: EGD 2009: No report available for review, dilation was performed  Colonoscopy 11/7/2011: Normal exam    Assessmentand Plan:  80 y.o. male with intermittent dysphagia to solids and mainly large pieces of meat. No other alarm symptoms identified. Symptoms likely secondary to reflux, esophageal spasm, hurried eating and swallowing large pieces.  -Detailed discussion regarding symptom etiology  -Antireflux lifestyle and lifestyle modification discussed in detail  -Start regular PPI therapy, will start at low dose of 20 mg omeprazole daily  -Endoscopy deferred at this time  -Will reevaluate need for dilation based on response to PPI therapy and lifestyle modification. Return in about 2 months (around 10/30/2019). Erick Heaton MD   Staff Gastroenterologist  [facility]    Please note this report has been partially produced using speech recognition software and may cause contain errors related to thatsystem including grammar, punctuation and spelling as well as words and phrases that may seem inappropriate. If there are questions or concerns please feel free to contact me to clarify.

## 2019-10-30 ENCOUNTER — OFFICE VISIT (OUTPATIENT)
Dept: GASTROENTEROLOGY | Age: 69
End: 2019-10-30
Payer: MEDICARE

## 2019-10-30 VITALS
HEART RATE: 62 BPM | BODY MASS INDEX: 25.16 KG/M2 | SYSTOLIC BLOOD PRESSURE: 126 MMHG | OXYGEN SATURATION: 96 % | WEIGHT: 166 LBS | DIASTOLIC BLOOD PRESSURE: 84 MMHG | HEIGHT: 68 IN

## 2019-10-30 DIAGNOSIS — R97.20 ELEVATED PSA: ICD-10-CM

## 2019-10-30 DIAGNOSIS — K21.9 GASTROESOPHAGEAL REFLUX DISEASE WITHOUT ESOPHAGITIS: Primary | ICD-10-CM

## 2019-10-30 DIAGNOSIS — Z12.11 COLON CANCER SCREENING: ICD-10-CM

## 2019-10-30 LAB — PROSTATE SPECIFIC ANTIGEN: 7.53 NG/ML (ref 0–5.4)

## 2019-10-30 PROCEDURE — 99213 OFFICE O/P EST LOW 20 MIN: CPT | Performed by: NURSE PRACTITIONER

## 2019-11-05 ENCOUNTER — OFFICE VISIT (OUTPATIENT)
Dept: UROLOGY | Age: 69
End: 2019-11-05
Payer: MEDICARE

## 2019-11-05 VITALS
HEIGHT: 68 IN | HEART RATE: 64 BPM | WEIGHT: 160 LBS | SYSTOLIC BLOOD PRESSURE: 144 MMHG | BODY MASS INDEX: 24.25 KG/M2 | DIASTOLIC BLOOD PRESSURE: 70 MMHG

## 2019-11-05 DIAGNOSIS — R30.0 BURNING WITH URINATION: Primary | ICD-10-CM

## 2019-11-05 DIAGNOSIS — N41.9 PROSTATITIS, UNSPECIFIED PROSTATITIS TYPE: ICD-10-CM

## 2019-11-05 DIAGNOSIS — R97.20 ELEVATED PSA: ICD-10-CM

## 2019-11-05 LAB
BILIRUBIN, POC: ABNORMAL
BLOOD URINE, POC: ABNORMAL
CLARITY, POC: CLEAR
COLOR, POC: YELLOW
GLUCOSE URINE, POC: ABNORMAL
KETONES, POC: ABNORMAL
LEUKOCYTE EST, POC: ABNORMAL
NITRITE, POC: ABNORMAL
PH, POC: 5.5
PROTEIN, POC: ABNORMAL
SPECIFIC GRAVITY, POC: 1.01
UROBILINOGEN, POC: 0.2

## 2019-11-05 PROCEDURE — 81003 URINALYSIS AUTO W/O SCOPE: CPT | Performed by: UROLOGY

## 2019-11-05 PROCEDURE — 99214 OFFICE O/P EST MOD 30 MIN: CPT | Performed by: UROLOGY

## 2019-11-05 RX ORDER — LEVOFLOXACIN 500 MG/1
500 TABLET, FILM COATED ORAL DAILY
Qty: 30 TABLET | Refills: 0 | Status: SHIPPED | OUTPATIENT
Start: 2019-11-05 | End: 2020-03-05 | Stop reason: ALTCHOICE

## 2019-11-05 ASSESSMENT — ENCOUNTER SYMPTOMS: ABDOMINAL PAIN: 0

## 2020-02-28 DIAGNOSIS — N41.9 PROSTATITIS, UNSPECIFIED PROSTATITIS TYPE: ICD-10-CM

## 2020-02-28 LAB — PROSTATE SPECIFIC ANTIGEN: 6.59 NG/ML (ref 0–5.4)

## 2020-03-05 ENCOUNTER — OFFICE VISIT (OUTPATIENT)
Dept: UROLOGY | Age: 70
End: 2020-03-05
Payer: MEDICARE

## 2020-03-05 VITALS
SYSTOLIC BLOOD PRESSURE: 130 MMHG | HEIGHT: 68 IN | WEIGHT: 160 LBS | HEART RATE: 68 BPM | BODY MASS INDEX: 24.25 KG/M2 | DIASTOLIC BLOOD PRESSURE: 80 MMHG

## 2020-03-05 PROCEDURE — 99214 OFFICE O/P EST MOD 30 MIN: CPT | Performed by: UROLOGY

## 2020-03-05 RX ORDER — DOXYCYCLINE HYCLATE 100 MG
100 TABLET ORAL 2 TIMES DAILY
Qty: 60 TABLET | Refills: 1 | Status: SHIPPED | OUTPATIENT
Start: 2020-03-05 | End: 2020-04-04

## 2020-03-05 ASSESSMENT — ENCOUNTER SYMPTOMS
ABDOMINAL PAIN: 0
ABDOMINAL DISTENTION: 0

## 2020-03-05 NOTE — PROGRESS NOTES
None     Attends Rastafari service: None     Active member of club or organization: None     Attends meetings of clubs or organizations: None     Relationship status: None    Intimate partner violence:     Fear of current or ex partner: None     Emotionally abused: None     Physically abused: None     Forced sexual activity: None   Other Topics Concern    None   Social History Narrative    None     Family History   Problem Relation Age of Onset    High Blood Pressure Mother     Cancer Mother         ovarian    High Blood Pressure Father     Emphysema Father     Cancer Father         prostate    Colon Cancer Neg Hx      Current Outpatient Medications   Medication Sig Dispense Refill    doxycycline hyclate (VIBRA-TABS) 100 MG tablet Take 1 tablet by mouth 2 times daily 60 tablet 1    omeprazole (PRILOSEC) 20 MG delayed release capsule Take 1 capsule by mouth Daily 30 capsule 3    vitamin C (ASCORBIC ACID) 500 MG tablet Take 500 mg by mouth daily       No current facility-administered medications for this visit. Bactrim [sulfamethoxazole-trimethoprim]  reviewed    Review of Systems   Constitutional: Negative for fever. Gastrointestinal: Negative for abdominal distention and abdominal pain. Genitourinary: Negative for flank pain and hematuria. Objective:   Physical Exam  Constitutional:       Appearance: Normal appearance. Genitourinary:     Prostate: Enlarged. Not tender and no nodules present. Rectum: No external hemorrhoid. Comments: Prostate is 3 +  Neurological:      Mental Status: He is alert.            PSA   Date Value Ref Range Status   02/28/2020 6.59 (H) 0.00 - 5.40 ng/mL Final   10/30/2019 7.53 (H) 0.00 - 5.40 ng/mL Final   04/29/2019 6.91 (H) 0.00 - 5.40 ng/mL Final   10/23/2018 4.91 0.00 - 5.40 ng/mL Final   04/09/2018 5.33 0.00 - 5.40 ng/mL Final     Diagnostic Psa   Date Value Ref Range Status   07/30/2014 4.19 0.00 - 5.40 ng/mL Final   07/05/2013 4.3 (H) 0.0 - 4.0

## 2020-05-29 ENCOUNTER — TELEPHONE (OUTPATIENT)
Dept: FAMILY MEDICINE CLINIC | Age: 70
End: 2020-05-29

## 2020-06-18 ENCOUNTER — TELEPHONE (OUTPATIENT)
Dept: FAMILY MEDICINE CLINIC | Age: 70
End: 2020-06-18

## 2020-06-20 ENCOUNTER — HOSPITAL ENCOUNTER (EMERGENCY)
Age: 70
Discharge: HOME | End: 2020-06-20
Attending: EMERGENCY MEDICINE
Payer: MEDICARE

## 2020-06-20 VITALS
TEMPERATURE: 98.1 F | HEART RATE: 70 BPM | OXYGEN SATURATION: 98 % | SYSTOLIC BLOOD PRESSURE: 170 MMHG | WEIGHT: 160 LBS | BODY MASS INDEX: 24.25 KG/M2 | DIASTOLIC BLOOD PRESSURE: 80 MMHG | RESPIRATION RATE: 18 BRPM | HEIGHT: 68 IN

## 2020-06-20 PROCEDURE — 6370000000 HC RX 637 (ALT 250 FOR IP): Performed by: EMERGENCY MEDICINE

## 2020-06-20 PROCEDURE — 12001 RPR S/N/AX/GEN/TRNK 2.5CM/<: CPT

## 2020-06-20 PROCEDURE — 2500000003 HC RX 250 WO HCPCS: Performed by: EMERGENCY MEDICINE

## 2020-06-20 PROCEDURE — 99282 EMERGENCY DEPT VISIT SF MDM: CPT

## 2020-06-20 RX ORDER — DIAPER,BRIEF,INFANT-TODD,DISP
EACH MISCELLANEOUS 2 TIMES DAILY
Status: DISCONTINUED | OUTPATIENT
Start: 2020-06-20 | End: 2020-06-20 | Stop reason: HOSPADM

## 2020-06-20 RX ORDER — LIDOCAINE HYDROCHLORIDE 10 MG/ML
5 INJECTION, SOLUTION INFILTRATION; PERINEURAL ONCE
Status: COMPLETED | OUTPATIENT
Start: 2020-06-20 | End: 2020-06-20

## 2020-06-20 RX ORDER — HYDROCODONE BITARTRATE AND ACETAMINOPHEN 5; 325 MG/1; MG/1
1 TABLET ORAL EVERY 6 HOURS PRN
Qty: 10 TABLET | Refills: 0 | Status: SHIPPED | OUTPATIENT
Start: 2020-06-20 | End: 2020-06-23

## 2020-06-20 RX ADMIN — LIDOCAINE HYDROCHLORIDE 5 ML: 10 INJECTION, SOLUTION INFILTRATION; PERINEURAL at 11:42

## 2020-06-20 RX ADMIN — BACITRACIN: 500 OINTMENT TOPICAL at 11:42

## 2020-06-20 ASSESSMENT — ENCOUNTER SYMPTOMS
VOICE CHANGE: 0
ABDOMINAL PAIN: 0
DIARRHEA: 0
STRIDOR: 0
EYE DISCHARGE: 0
EYE REDNESS: 0
SHORTNESS OF BREATH: 0
TROUBLE SWALLOWING: 0
COUGH: 0
WHEEZING: 0
SORE THROAT: 0
VOMITING: 0
EYE PAIN: 0
CONSTIPATION: 0
CHEST TIGHTNESS: 0
BACK PAIN: 0
BLOOD IN STOOL: 0
FACIAL SWELLING: 0
CHOKING: 0
SINUS PRESSURE: 0

## 2020-06-20 ASSESSMENT — PAIN SCALES - GENERAL: PAINLEVEL_OUTOF10: 3

## 2020-06-20 NOTE — ED PROVIDER NOTES
There is no abdominal tenderness. There is no right CVA tenderness, guarding or rebound. Musculoskeletal: Normal range of motion. General: Swelling and tenderness present. Lymphadenopathy:      Cervical: No cervical adenopathy. Skin:     General: Skin is warm. Findings: No erythema or rash. Comments: Patient come to the right fourth finger patient has a large laceration measuring 3 cm by point 2 cm on the volar aspect no tendon injury no foreign body visible bleeding seems to be controlled at this time neurovascular is intact, visible flexor tendon I cannot exclude a small nick to the flexor tendon but patient functions are very good   Neurological:      Mental Status: He is alert and oriented to person, place, and time. Cranial Nerves: No cranial nerve deficit. Motor: No abnormal muscle tone. Psychiatric:         Behavior: Behavior normal.         Thought Content: Thought content normal.         DIAGNOSTIC RESULTS     EKG: All EKG's are interpreted by the Emergency Department Physician who either signs or Co-signsthis chart in the absence of a cardiologist.        RADIOLOGY:   [place] such as CT, Ultrasound and MRI are read by the radiologist. Plain radiographic images are visualized and preliminarily interpreted by the emergency physician with the below findings:        Interpretation per the Radiologist below, if available at the time ofthis note:    No orders to display         ED BEDSIDE ULTRASOUND:   Performed by ED Physician - none    LABS:  Labs Reviewed - No data to display    All other labs were within normal range or not returned as of this dictation.     EMERGENCY DEPARTMENT COURSE and DIFFERENTIAL DIAGNOSIS/MDM:   Vitals:    Vitals:    06/20/20 1133 06/20/20 1235   BP: (!) 170/80    Pulse: 70    Resp: 18    Temp: 98.1 °F (36.7 °C)    TempSrc: Oral    SpO2:  98%   Weight: 160 lb (72.6 kg)    Height: 5' 8\" (1.727 m)            MDM    CRITICAL CARE TIME

## 2020-06-28 ENCOUNTER — OFFICE VISIT (OUTPATIENT)
Dept: FAMILY MEDICINE CLINIC | Age: 70
End: 2020-06-28
Payer: MEDICARE

## 2020-06-28 VITALS
HEIGHT: 68 IN | BODY MASS INDEX: 24.25 KG/M2 | HEART RATE: 93 BPM | TEMPERATURE: 97.6 F | OXYGEN SATURATION: 97 % | WEIGHT: 160 LBS | SYSTOLIC BLOOD PRESSURE: 130 MMHG | DIASTOLIC BLOOD PRESSURE: 64 MMHG

## 2020-06-28 PROCEDURE — 99213 OFFICE O/P EST LOW 20 MIN: CPT | Performed by: NURSE PRACTITIONER

## 2020-09-14 LAB — PROSTATE SPECIFIC ANTIGEN: 7.48 NG/ML (ref 0–5.4)

## 2020-09-17 ENCOUNTER — OFFICE VISIT (OUTPATIENT)
Dept: UROLOGY | Age: 70
End: 2020-09-17
Payer: MEDICARE

## 2020-09-17 VITALS
HEART RATE: 87 BPM | HEIGHT: 68 IN | SYSTOLIC BLOOD PRESSURE: 134 MMHG | BODY MASS INDEX: 24.25 KG/M2 | WEIGHT: 160 LBS | DIASTOLIC BLOOD PRESSURE: 82 MMHG

## 2020-09-17 PROCEDURE — 99213 OFFICE O/P EST LOW 20 MIN: CPT | Performed by: UROLOGY

## 2020-09-17 ASSESSMENT — ENCOUNTER SYMPTOMS
SHORTNESS OF BREATH: 0
ABDOMINAL PAIN: 0
ABDOMINAL DISTENTION: 0

## 2020-09-17 NOTE — PROGRESS NOTES
Subjective:      Patient ID: Judge Saldana is a 77 y.o. male. HPI  This is a 67 yo white male with h/o BPH without LUTs and elevated PSA's and prior prostatitis (11/19 treated with Levaquin, stopped because of tendon pain) back in follow-up. Since last seen on 3/5/20, he has no new complaints. He has no hematuria or dysuria or pain. He has no voiding complaints. He has no new medical or surgical problems.  I reviewed the interval PSA. He tried Flomax in the past and got dizzy and so stopped the medication.      Prostate MRI 12/11/18: No focal lesions suspicious for clinically significant cancer  Trus/Prostate biopsy 6/2012: neg, PV 31.5 cc  (PSA 7.19 ng/ml)  Trus/Prostate biopsy 11/2010: one focus of HGPIN, others neg   PCA3: neg, (10.7)      Past Medical History:   Diagnosis Date    GERD (gastroesophageal reflux disease)     PSA elevation      Past Surgical History:   Procedure Laterality Date    PROSTATE BIOPSY      x2     Social History     Socioeconomic History    Marital status:      Spouse name: None    Number of children: None    Years of education: None    Highest education level: None   Occupational History    None   Social Needs    Financial resource strain: None    Food insecurity     Worry: None     Inability: None    Transportation needs     Medical: None     Non-medical: None   Tobacco Use    Smoking status: Never Smoker    Smokeless tobacco: Never Used   Substance and Sexual Activity    Alcohol use: Not Currently     Alcohol/week: 0.0 standard drinks    Drug use: No    Sexual activity: Yes     Partners: Female   Lifestyle    Physical activity     Days per week: None     Minutes per session: None    Stress: None   Relationships    Social connections     Talks on phone: None     Gets together: None     Attends Bahai service: None     Active member of club or organization: None     Attends meetings of clubs or organizations: None     Relationship status: None    Intimate partner violence     Fear of current or ex partner: None     Emotionally abused: None     Physically abused: None     Forced sexual activity: None   Other Topics Concern    None   Social History Narrative    None     Family History   Problem Relation Age of Onset    High Blood Pressure Mother     Cancer Mother         ovarian    High Blood Pressure Father     Emphysema Father     Cancer Father         prostate    Colon Cancer Neg Hx      Current Outpatient Medications   Medication Sig Dispense Refill    omeprazole (PRILOSEC) 20 MG delayed release capsule Take 1 capsule by mouth Daily 30 capsule 3    vitamin C (ASCORBIC ACID) 500 MG tablet Take 500 mg by mouth daily       No current facility-administered medications for this visit. Bactrim [sulfamethoxazole-trimethoprim]  reviewed    Review of Systems   Constitutional: Negative for unexpected weight change. Respiratory: Negative for shortness of breath. Cardiovascular: Negative for chest pain. Gastrointestinal: Negative for abdominal distention and abdominal pain. Genitourinary: Negative for difficulty urinating, dysuria, flank pain, frequency and hematuria. Objective:   Physical Exam  Constitutional:       Appearance: Normal appearance. Neurological:      Mental Status: He is alert. Psychiatric:         Mood and Affect: Mood normal.           PSA   Date Value Ref Range Status   09/14/2020 7.48 (H) 0.00 - 5.40 ng/mL Final     Comment:     When the Total PSA is between 3.00 and 10.00 ng/mL, consider  requesting a Free PSA to aid in diagnosis. 02/28/2020 6.59 (H) 0.00 - 5.40 ng/mL Final   10/30/2019 7.53 (H) 0.00 - 5.40 ng/mL Final   04/29/2019 6.91 (H) 0.00 - 5.40 ng/mL Final   10/23/2018 4.91 0.00 - 5.40 ng/mL Final     Diagnostic Psa   Date Value Ref Range Status   07/30/2014 4.19 0.00 - 5.40 ng/mL Final   07/05/2013 4.3 (H) 0.0 - 4.0 ng/mL Final       Assessment:       This is a 67 yo white male with h/o BPH with mild LUTs (stable) and h/o elevated PSA's (2 prior biopsies negative, PCA3 normal and recent negative MRI) and with a persistently elevated but relatively stable PSA. The option of repeat prostate biopsy vs MRI was again discussed and he just wants to continue with closer PSA observation. He understands the approx 25% risk for prostate cancer based on his current PSA. Plan:      1.  F/U 6 mo for PSA        Corinna MD [facility]ionic[facility]

## 2020-09-20 ENCOUNTER — TELEPHONE (OUTPATIENT)
Dept: PRIMARY CARE CLINIC | Age: 70
End: 2020-09-20

## 2020-09-20 NOTE — TELEPHONE ENCOUNTER
Called pt and left VM to call us to complete AWV and can call us at [facility] today [phone], option #2.

## 2021-02-09 ENCOUNTER — OFFICE VISIT (OUTPATIENT)
Dept: GASTROENTEROLOGY | Age: 71
End: 2021-02-09
Payer: MEDICARE

## 2021-02-09 ENCOUNTER — NURSE ONLY (OUTPATIENT)
Dept: PRIMARY CARE CLINIC | Age: 71
End: 2021-02-09

## 2021-02-09 VITALS — WEIGHT: 168 LBS | BODY MASS INDEX: 25.46 KG/M2 | HEART RATE: 63 BPM | OXYGEN SATURATION: 99 % | HEIGHT: 68 IN

## 2021-02-09 DIAGNOSIS — Z12.11 SCREENING FOR COLON CANCER: ICD-10-CM

## 2021-02-09 DIAGNOSIS — Z01.818 PREOP TESTING: ICD-10-CM

## 2021-02-09 DIAGNOSIS — R13.19 ESOPHAGEAL DYSPHAGIA: ICD-10-CM

## 2021-02-09 DIAGNOSIS — K21.9 GASTROESOPHAGEAL REFLUX DISEASE, UNSPECIFIED WHETHER ESOPHAGITIS PRESENT: Primary | ICD-10-CM

## 2021-02-09 PROCEDURE — 99214 OFFICE O/P EST MOD 30 MIN: CPT | Performed by: INTERNAL MEDICINE

## 2021-02-09 RX ORDER — SODIUM, POTASSIUM,MAG SULFATES 17.5-3.13G
SOLUTION, RECONSTITUTED, ORAL ORAL
Qty: 1 BOTTLE | Refills: 0 | Status: SHIPPED | OUTPATIENT
Start: 2021-02-09 | End: 2021-03-23

## 2021-02-10 LAB
SARS-COV-2: NOT DETECTED
SOURCE: NORMAL

## 2021-02-15 ENCOUNTER — ANESTHESIA EVENT (OUTPATIENT)
Dept: ENDOSCOPY | Age: 71
End: 2021-02-15
Payer: MEDICARE

## 2021-02-15 ENCOUNTER — ANCILLARY PROCEDURE (OUTPATIENT)
Dept: ENDOSCOPY | Age: 71
End: 2021-02-15
Attending: INTERNAL MEDICINE
Payer: MEDICARE

## 2021-02-15 ENCOUNTER — ANESTHESIA (OUTPATIENT)
Dept: ENDOSCOPY | Age: 71
End: 2021-02-15
Payer: MEDICARE

## 2021-02-15 ENCOUNTER — HOSPITAL ENCOUNTER (OUTPATIENT)
Age: 71
Setting detail: OUTPATIENT SURGERY
Discharge: HOME | End: 2021-02-15
Attending: INTERNAL MEDICINE | Admitting: INTERNAL MEDICINE
Payer: MEDICARE

## 2021-02-15 VITALS — SYSTOLIC BLOOD PRESSURE: 125 MMHG | DIASTOLIC BLOOD PRESSURE: 68 MMHG | OXYGEN SATURATION: 98 %

## 2021-02-15 VITALS
RESPIRATION RATE: 16 BRPM | OXYGEN SATURATION: 99 % | WEIGHT: 162 LBS | BODY MASS INDEX: 24.55 KG/M2 | HEIGHT: 68 IN | TEMPERATURE: 97.4 F | HEART RATE: 62 BPM | DIASTOLIC BLOOD PRESSURE: 80 MMHG | SYSTOLIC BLOOD PRESSURE: 177 MMHG

## 2021-02-15 PROCEDURE — 6370000000 HC RX 637 (ALT 250 FOR IP): Performed by: INTERNAL MEDICINE

## 2021-02-15 PROCEDURE — 43239 EGD BIOPSY SINGLE/MULTIPLE: CPT | Performed by: INTERNAL MEDICINE

## 2021-02-15 PROCEDURE — 2709999900 HC NON-CHARGEABLE SUPPLY: Performed by: INTERNAL MEDICINE

## 2021-02-15 PROCEDURE — 88313 SPECIAL STAINS GROUP 2: CPT

## 2021-02-15 PROCEDURE — 7100000011 HC PHASE II RECOVERY - ADDTL 15 MIN: Performed by: INTERNAL MEDICINE

## 2021-02-15 PROCEDURE — 45390 COLONOSCOPY W/RESECTION: CPT | Performed by: INTERNAL MEDICINE

## 2021-02-15 PROCEDURE — 2580000003 HC RX 258: Performed by: INTERNAL MEDICINE

## 2021-02-15 PROCEDURE — 7100000010 HC PHASE II RECOVERY - FIRST 15 MIN: Performed by: INTERNAL MEDICINE

## 2021-02-15 PROCEDURE — 2580000003 HC RX 258

## 2021-02-15 PROCEDURE — 3700000001 HC ADD 15 MINUTES (ANESTHESIA): Performed by: INTERNAL MEDICINE

## 2021-02-15 PROCEDURE — 2500000003 HC RX 250 WO HCPCS: Performed by: NURSE ANESTHETIST, CERTIFIED REGISTERED

## 2021-02-15 PROCEDURE — 3609027000 HC COLONOSCOPY: Performed by: INTERNAL MEDICINE

## 2021-02-15 PROCEDURE — 6360000002 HC RX W HCPCS: Performed by: NURSE ANESTHETIST, CERTIFIED REGISTERED

## 2021-02-15 PROCEDURE — 3700000000 HC ANESTHESIA ATTENDED CARE: Performed by: INTERNAL MEDICINE

## 2021-02-15 PROCEDURE — 88305 TISSUE EXAM BY PATHOLOGIST: CPT

## 2021-02-15 PROCEDURE — 3609017100 HC EGD: Performed by: INTERNAL MEDICINE

## 2021-02-15 RX ORDER — SODIUM CHLORIDE 9 MG/ML
INJECTION, SOLUTION INTRAVENOUS CONTINUOUS
Status: DISCONTINUED | OUTPATIENT
Start: 2021-02-15 | End: 2021-02-15 | Stop reason: HOSPADM

## 2021-02-15 RX ORDER — SODIUM CHLORIDE 9 MG/ML
INJECTION, SOLUTION INTRAVENOUS
Status: COMPLETED
Start: 2021-02-15 | End: 2021-02-15

## 2021-02-15 RX ORDER — PROPOFOL 10 MG/ML
INJECTION, EMULSION INTRAVENOUS PRN
Status: DISCONTINUED | OUTPATIENT
Start: 2021-02-15 | End: 2021-02-15 | Stop reason: SDUPTHER

## 2021-02-15 RX ORDER — GLYCOPYRROLATE 1 MG/5 ML
SYRINGE (ML) INTRAVENOUS PRN
Status: DISCONTINUED | OUTPATIENT
Start: 2021-02-15 | End: 2021-02-15 | Stop reason: SDUPTHER

## 2021-02-15 RX ORDER — LIDOCAINE HYDROCHLORIDE 20 MG/ML
INJECTION, SOLUTION INFILTRATION; PERINEURAL PRN
Status: DISCONTINUED | OUTPATIENT
Start: 2021-02-15 | End: 2021-02-15 | Stop reason: SDUPTHER

## 2021-02-15 RX ORDER — MAGNESIUM HYDROXIDE 1200 MG/15ML
LIQUID ORAL PRN
Status: DISCONTINUED | OUTPATIENT
Start: 2021-02-15 | End: 2021-02-15 | Stop reason: ALTCHOICE

## 2021-02-15 RX ORDER — SIMETHICONE 20 MG/.3ML
EMULSION ORAL PRN
Status: DISCONTINUED | OUTPATIENT
Start: 2021-02-15 | End: 2021-02-15 | Stop reason: ALTCHOICE

## 2021-02-15 RX ADMIN — PROPOFOL 500 MG: 10 INJECTION, EMULSION INTRAVENOUS at 10:36

## 2021-02-15 RX ADMIN — Medication 0.2 MG: at 10:37

## 2021-02-15 RX ADMIN — SODIUM CHLORIDE: 9 INJECTION, SOLUTION INTRAVENOUS at 09:53

## 2021-02-15 RX ADMIN — LIDOCAINE HYDROCHLORIDE 60 MG: 20 INJECTION, SOLUTION INFILTRATION; PERINEURAL at 10:36

## 2021-02-15 ASSESSMENT — PULMONARY FUNCTION TESTS
PIF_VALUE: 1

## 2021-02-15 NOTE — H&P
Patient Name: Kirill Rosado  : 1950  MRN: [id number]  DATE: 02/15/21      ENDOSCOPY  History and Physical    Procedure:    [] Diagnostic Colonoscopy       [x] Screening Colonoscopy  [x] EGD      [] ERCP      [] EUS       [] Other    [x] Previous office notes/History and Physical reviewed from the patients chart. Please see EMR for further details of HPI. I have examined the patient's status immediately prior to the procedure and:      Indications/HPI:    []Abdominal Pain   []Cancer- GI/Lung  []Fhx of colon CA  []History of Polyps   []Footes   []Melena  []Abnormal Imaging   [x]Dysphagia    []Persistent Pneumonia  []Anemia   []Food Impaction  []History of Polyps  []GI Bleed   []Pulmonary nodule/Mass  []Change in bowel habits  [x]Heartburn/Reflux  []Rectal Bleed (BRBPR)  []Chest Pain - Non Cardiac  []Heme (+) Stool  []Ulcers  []Constipation   []Hemoptysis   []Varices  []Diarrhea   []Hypoxemia  []Nausea/Vomiting   [x]Screening   []Crohns/Colitis  []Other:    Anesthesia:   [x] MAC [] Moderate Sedation   [] General   [] None     ROS: 12 pt Review of Symptoms was negative unless mentioned above    Medications:   Prior to Admission medications    Medication Sig Start Date End Date Taking? Authorizing Provider   Na Sulfate-K Sulfate-Mg Sulf 17.5-3.13-1.6 GM/177ML SOLN As directed 21  Yes Jimbo Moser MD   omeprazole (PRILOSEC) 20 MG delayed release capsule Take 1 capsule by mouth Daily 19   Jimbo Moser MD   vitamin C (ASCORBIC ACID) 500 MG tablet Take 500 mg by mouth daily    Historical Provider, MD     Allergies:    Allergies   Allergen Reactions    Bactrim [Sulfamethoxazole-Trimethoprim] Hives     Patient stated got hives, itching after taking Bactrim      History of allergic reaction to anesthesia:  No    Past Medical History:  Past Medical History:   Diagnosis Date    GERD (gastroesophageal reflux disease)     PSA elevation      Past Surgical History:  Past Surgical History:   Procedure Laterality Date    COLONOSCOPY      2011    ENDOSCOPY, COLON, DIAGNOSTIC      PROSTATE BIOPSY      x2     Social History:  Social History     Tobacco Use    Smoking status: Never Smoker    Smokeless tobacco: Never Used   Substance Use Topics    Alcohol use: Not Currently     Alcohol/week: 0.0 standard drinks    Drug use: No     Vital Signs:   Vitals:    02/15/21 0948   BP: 139/71   Pulse: 67   Resp: 16   Temp: 97.4 °F (36.3 °C)   SpO2: 97%     Physical Exam:  Cardiac:  [x]WNL []Comments:  Pulmonary:  [x]WNL   []Comments:   Neuro/Mental Status:  [x]WNL  []Comments:  Abdominal:  [x]WNL    []Comments:  Other:   []WNL  []Comments:    Informed Consent:  The risks and benefits of the procedure have been discussed with either the patient or if they cannot consent, their representative. Assessment:  Patient examined and appropriate for planned sedation and procedure. Plan:  Proceed with planned sedation and procedure as above. The patient was counseled at length about risks of vida COVID-19 in the perioperative and any recovery window from the procedure. The patient was made aware that vida COVID-19 may worsen their prognosis for recovery from their procedure and lend to a higher morbidity and-all mortality risk. The patient was given the option of postponing the procedure all material risks, benefits, and alternatives were discussed. The patient does wish to proceed with the procedure at this time.     Farhad Callejas MD  10:30 AM

## 2021-02-15 NOTE — ANESTHESIA POSTPROCEDURE EVALUATION
Department of Anesthesiology  Postprocedure Note    Patient: David Lee  MRN: [id number]  YOB: 1950  Date of evaluation: 2/15/2021  Time:  11:08 AM     Procedure Summary     Date: 02/15/21 Room / Location: [address] [place] [address] / [facility]    Anesthesia Start: 5910 Anesthesia Stop:     Procedures:       EGD ESOPHAGOGASTRODUODENOSCOPY with biopsies (N/A )      COLORECTAL CANCER SCREENING with polypectomy (N/A ) Diagnosis: (esophageal dysphagia, screening for colon cancer R13.10, Z12.11)    Surgeons: Valdemar Lyles MD Responsible Provider: ELLE Duke CRNA    Anesthesia Type: MAC ASA Status: 2          Anesthesia Type: MAC    Taina Phase I:      Taina Phase II:      Last vitals: Reviewed and per EMR flowsheets.        Anesthesia Post Evaluation    Patient location during evaluation: bedside  Patient participation: complete - patient participated  Level of consciousness: awake and awake and alert  Pain score: 0  Airway patency: patent  Nausea & Vomiting: no nausea and no vomiting  Complications: no  Cardiovascular status: blood pressure returned to baseline and hemodynamically stable  Respiratory status: acceptable and spontaneous ventilation  Hydration status: euvolemic

## 2021-02-15 NOTE — ANESTHESIA PRE PROCEDURE
standard drinks                                Counseling given: Not Answered      Vital Signs (Current): There were no vitals filed for this visit. BP Readings from Last 3 Encounters:   09/17/20 134/82   06/28/20 130/64   06/26/20 110/70       NPO Status:                                                                                 BMI:   Wt Readings from Last 3 Encounters:   02/09/21 168 lb (76.2 kg)   09/17/20 160 lb (72.6 kg)   06/28/20 160 lb (72.6 kg)     There is no height or weight on file to calculate BMI.    CBC:   Lab Results   Component Value Date    WBC 6.2 08/13/2019    RBC 4.73 08/13/2019    HGB 14.6 08/13/2019    HCT 42.6 08/13/2019    MCV 90.2 08/13/2019    RDW 14.3 08/13/2019     08/13/2019       CMP:   Lab Results   Component Value Date     08/13/2019    K 4.8 08/13/2019     08/13/2019    CO2 24 08/13/2019    BUN 15 08/13/2019    CREATININE 0.80 08/13/2019    GFRAA >60.0 08/13/2019    LABGLOM >60.0 08/13/2019    GLUCOSE 79 08/13/2019    PROT 7.0 08/13/2019    CALCIUM 9.3 08/13/2019    BILITOT 0.5 08/13/2019    ALKPHOS 73 08/13/2019    AST 23 08/13/2019    ALT 17 08/13/2019       POC Tests: No results for input(s): POCGLU, POCNA, POCK, POCCL, POCBUN, POCHEMO, POCHCT in the last 72 hours.     Coags: No results found for: PROTIME, INR, APTT    HCG (If Applicable): No results found for: PREGTESTUR, PREGSERUM, HCG, HCGQUANT     ABGs: No results found for: PHART, PO2ART, HSN3GDT, BEE8OTQ, BEART, N7IITQUF     Type & Screen (If Applicable):  No results found for: LABABO, LABRH    Drug/Infectious Status (If Applicable):  No results found for: HIV, HEPCAB    COVID-19 Screening (If Applicable):   Lab Results   Component Value Date    COVID19 Not Detected 02/09/2021         Anesthesia Evaluation    Airway: Mallampati: II  TM distance: >3 FB   Neck ROM: full  Mouth opening: > = 3 FB Dental:          Pulmonary:Negative Pulmonary ROS and normal exam

## 2021-03-15 DIAGNOSIS — R97.20 ELEVATED PSA: ICD-10-CM

## 2021-03-15 LAB — PROSTATE SPECIFIC ANTIGEN: 8.09 NG/ML (ref 0–6.22)

## 2021-03-23 ENCOUNTER — OFFICE VISIT (OUTPATIENT)
Dept: UROLOGY | Age: 71
End: 2021-03-23
Payer: MEDICARE

## 2021-03-23 VITALS
HEIGHT: 68 IN | DIASTOLIC BLOOD PRESSURE: 74 MMHG | WEIGHT: 160 LBS | SYSTOLIC BLOOD PRESSURE: 142 MMHG | HEART RATE: 63 BPM | BODY MASS INDEX: 24.25 KG/M2

## 2021-03-23 DIAGNOSIS — R97.20 ELEVATED PSA: Primary | ICD-10-CM

## 2021-03-23 PROCEDURE — 99213 OFFICE O/P EST LOW 20 MIN: CPT | Performed by: UROLOGY

## 2021-03-23 ASSESSMENT — ENCOUNTER SYMPTOMS
ABDOMINAL PAIN: 0
ABDOMINAL DISTENTION: 0

## 2021-03-23 NOTE — PROGRESS NOTES
Subjective:      Patient ID: Lorre Habermann is a 72 y.o. male. HPI  This is a 71 yo white male with h/o BPH without LUTs and elevated PSA's and prior prostatitis (11/19 treated with Levaquin, stopped because of tendon pain) back in follow-up. Since last seen on 9/17/20, he has no hematuria or dysuria or pain. He has no voiding complaints. He has no new medical or surgical problems.  I reviewed the interval PSA. He tried Flomax in the past and got dizzy and so stopped the medication.      Prostate MRI 12/11/18: No focal lesions suspicious for clinically significant cancer  Trus/Prostate biopsy 6/2012: neg, PV 31.5 cc  (PSA 7.19 ng/ml)  Trus/Prostate biopsy 11/2010: one focus of HGPIN, others neg   PCA3: neg, (10.7)     Past Medical History:   Diagnosis Date    GERD (gastroesophageal reflux disease)     PSA elevation      Past Surgical History:   Procedure Laterality Date    COLONOSCOPY      2011    COLONOSCOPY N/A 2/15/2021    COLORECTAL CANCER SCREENING with polypectomy performed by Sabrina Myrick MD at [address] [place] [address], DIAGNOSTIC      PROSTATE BIOPSY      x2    UPPER GASTROINTESTINAL ENDOSCOPY N/A 2/15/2021    EGD ESOPHAGOGASTRODUODENOSCOPY with biopsies performed by Sabrina Myrick MD at [address] [place] [address] History     Socioeconomic History    Marital status:      Spouse name: None    Number of children: None    Years of education: None    Highest education level: None   Occupational History    None   Social Needs    Financial resource strain: None    Food insecurity     Worry: None     Inability: None    Transportation needs     Medical: None     Non-medical: None   Tobacco Use    Smoking status: Never Smoker    Smokeless tobacco: Never Used   Substance and Sexual Activity    Alcohol use: Not Currently     Alcohol/week: 0.0 standard drinks    Drug use: No    Sexual activity: Yes     Partners: Female   Lifestyle    Physical activity     Days per week: None Minutes per session: None    Stress: None   Relationships    Social connections     Talks on phone: None     Gets together: None     Attends Cheondoism service: None     Active member of club or organization: None     Attends meetings of clubs or organizations: None     Relationship status: None    Intimate partner violence     Fear of current or ex partner: None     Emotionally abused: None     Physically abused: None     Forced sexual activity: None   Other Topics Concern    None   Social History Narrative    None     Family History   Problem Relation Age of Onset    High Blood Pressure Mother     Cancer Mother         ovarian    High Blood Pressure Father     Emphysema Father     Cancer Father         prostate    Colon Cancer Neg Hx      Current Outpatient Medications   Medication Sig Dispense Refill    omeprazole (PRILOSEC) 20 MG delayed release capsule Take 1 capsule by mouth Daily 30 capsule 3    vitamin C (ASCORBIC ACID) 500 MG tablet Take 500 mg by mouth daily       No current facility-administered medications for this visit. Bactrim [sulfamethoxazole-trimethoprim]  reviewed      Review of Systems   Constitutional: Negative for unexpected weight change. Cardiovascular: Negative for chest pain. Gastrointestinal: Negative for abdominal distention and abdominal pain. Genitourinary: Negative for difficulty urinating, dysuria, enuresis, flank pain, frequency and hematuria. Objective:   Physical Exam  Constitutional:       Appearance: Normal appearance. Genitourinary:     Prostate: Enlarged. Not tender and no nodules present. Rectum: External hemorrhoid present. Neurological:      Mental Status: He is alert.    Psychiatric:         Mood and Affect: Mood normal.           PSA   Date Value Ref Range Status   03/15/2021 8.09 (H) 0.00 - 6.22 ng/mL Final   09/14/2020 7.48 (H) 0.00 - 5.40 ng/mL Final     Comment:     When the Total PSA is between 3.00 and 10.00 ng/mL,

## 2021-09-28 ENCOUNTER — OFFICE VISIT (OUTPATIENT)
Dept: UROLOGY | Age: 71
End: 2021-09-28
Payer: MEDICARE

## 2021-09-28 VITALS
HEIGHT: 68 IN | DIASTOLIC BLOOD PRESSURE: 80 MMHG | SYSTOLIC BLOOD PRESSURE: 138 MMHG | BODY MASS INDEX: 24.25 KG/M2 | WEIGHT: 160 LBS | HEART RATE: 70 BPM

## 2021-09-28 DIAGNOSIS — R97.20 ELEVATED PSA: Primary | ICD-10-CM

## 2021-09-28 PROCEDURE — 99213 OFFICE O/P EST LOW 20 MIN: CPT | Performed by: UROLOGY

## 2021-09-28 ASSESSMENT — ENCOUNTER SYMPTOMS
ABDOMINAL DISTENTION: 0
ABDOMINAL PAIN: 0

## 2021-09-28 NOTE — PROGRESS NOTES
Subjective:      Patient ID: Catina Goldman is a 72 y.o. male. HPI This is a 73 yo white male with h/o BPH without LUTs and elevated PSA's and prior prostatitis (11/19 treated with Levaquin, stopped because of tendon pain) back in follow-up. Since last seen on 3/23/21, he has no hematuria or dysuria or pain. He has no voiding complaints. He has no new medical or surgical problems.  I reviewed the interval PSA. He tried Flomax in the past and got dizzy and so stopped the medication.      Prostate MRI 12/11/18: No focal lesions suspicious for clinically significant cancer  Trus/Prostate biopsy 6/2012: neg, PV 31.5 cc  (PSA 7.19 ng/ml)  Trus/Prostate biopsy 11/2010: one focus of HGPIN, others neg   PCA3: neg, (10.7)      Past Medical History:   Diagnosis Date    GERD (gastroesophageal reflux disease)     PSA elevation      Past Surgical History:   Procedure Laterality Date    COLONOSCOPY      2011    COLONOSCOPY N/A 2/15/2021    COLORECTAL CANCER SCREENING with polypectomy performed by Josue Hebert MD at [address] [place] [address], DIAGNOSTIC      PROSTATE BIOPSY      x2    UPPER GASTROINTESTINAL ENDOSCOPY N/A 2/15/2021    EGD ESOPHAGOGASTRODUODENOSCOPY with biopsies performed by Josue Hebert MD at [address] [place] [address] History     Socioeconomic History    Marital status:      Spouse name: None    Number of children: None    Years of education: None    Highest education level: None   Occupational History    None   Tobacco Use    Smoking status: Never Smoker    Smokeless tobacco: Never Used   Vaping Use    Vaping Use: Never assessed   Substance and Sexual Activity    Alcohol use: Not Currently     Alcohol/week: 0.0 standard drinks    Drug use: No    Sexual activity: Yes     Partners: Female   Other Topics Concern    None   Social History Narrative    None     Social Determinants of Health     Financial Resource Strain:     Difficulty of Paying Living Expenses:    Food Insecurity:     Worried About Running Out of Food in the Last Year:     [address] in the Last Year:    Transportation Needs:     Lack of Transportation (Medical):  Lack of Transportation (Non-Medical):    Physical Activity:     Days of Exercise per Week:     Minutes of Exercise per Session:    Stress:     Feeling of Stress :    Social Connections:     Frequency of Communication with Friends and Family:     Frequency of Social Gatherings with Friends and Family:     Attends Voodoo Services:     Active Member of Clubs or Organizations:     Attends Club or Organization Meetings:     Marital Status:    Intimate Partner Violence:     Fear of Current or Ex-Partner:     Emotionally Abused:     Physically Abused:     Sexually Abused:      Family History   Problem Relation Age of Onset    High Blood Pressure Mother     Cancer Mother         ovarian    High Blood Pressure Father     Emphysema Father     Cancer Father         prostate    Colon Cancer Neg Hx      Current Outpatient Medications   Medication Sig Dispense Refill    omeprazole (PRILOSEC) 20 MG delayed release capsule Take 1 capsule by mouth Daily 30 capsule 3    vitamin C (ASCORBIC ACID) 500 MG tablet Take 500 mg by mouth daily       No current facility-administered medications for this visit. Bactrim [sulfamethoxazole-trimethoprim]  reviewed    Review of Systems   Constitutional: Negative for unexpected weight change. Gastrointestinal: Negative for abdominal distention and abdominal pain. Objective:   Physical Exam  Abdominal:      General: There is no distension. Neurological:      Mental Status: He is alert.    Psychiatric:         Mood and Affect: Mood normal.           PSA   Date Value Ref Range Status   09/22/2021 9.61 (H) 0.00 - 4.00 ng/mL Final   03/15/2021 8.09 (H) 0.00 - 6.22 ng/mL Final   09/14/2020 7.48 (H) 0.00 - 5.40 ng/mL Final     Comment:     When the Total PSA is between 3.00 and 10.00 ng/mL, consider  requesting a Free PSA to aid in diagnosis. 02/28/2020 6.59 (H) 0.00 - 5.40 ng/mL Final   10/30/2019 7.53 (H) 0.00 - 5.40 ng/mL Final     Diagnostic Psa   Date Value Ref Range Status   07/30/2014 4.19 0.00 - 5.40 ng/mL Final   07/05/2013 4.3 (H) 0.0 - 4.0 ng/mL Final       Assessment: This is a 73 yo white male with h/o BPH with mild LUTs (stable) and h/o elevated PSA's (2 prior biopsies negative, PCA3 normal and negative MRI in 2018) and with a persistently elevated and rising PSA. The option of repeat prostate biopsy vs MRI was again discussed and he wants to consider a repeat prostate MRI and possible fusion biopsy. Plan:      1. F/U 6 mo for PSA  2.  Refer to Dr Dominik Davis for repeat MRI of prostate        Bhanu Rahman MD

## 2022-03-25 DIAGNOSIS — R97.20 ELEVATED PSA: ICD-10-CM

## 2022-03-25 LAB — PROSTATE SPECIFIC ANTIGEN: 8.26 NG/ML (ref 0–4)

## 2022-04-01 ENCOUNTER — OFFICE VISIT (OUTPATIENT)
Dept: UROLOGY | Age: 72
End: 2022-04-01
Payer: MEDICARE

## 2022-04-01 VITALS
BODY MASS INDEX: 24.25 KG/M2 | DIASTOLIC BLOOD PRESSURE: 86 MMHG | WEIGHT: 160 LBS | SYSTOLIC BLOOD PRESSURE: 122 MMHG | HEIGHT: 68 IN | HEART RATE: 51 BPM

## 2022-04-01 DIAGNOSIS — R97.20 ELEVATED PSA: Primary | ICD-10-CM

## 2022-04-01 PROCEDURE — 99213 OFFICE O/P EST LOW 20 MIN: CPT | Performed by: UROLOGY

## 2022-04-01 ASSESSMENT — ENCOUNTER SYMPTOMS
SHORTNESS OF BREATH: 0
ABDOMINAL PAIN: 0
ABDOMINAL DISTENTION: 0

## 2022-04-01 NOTE — PROGRESS NOTES
Subjective:      Patient ID: Yvette Rogers is a 80 y.o. male    HPI  This is a 75 yo white male with h/o BPH without LUTs and elevated PSA's and prior prostatitis (11/19 treated with Levaquin, stopped because of tendon pain) back in follow-up. Since last seen on 9/28/21, he has no hematuria or dysuria or pain. He has no voiding complaints. He has no new medical or surgical problems. I reviewed the interval PSA. He tried Flomax in the past and got dizzy and so stopped the medication. I reviewed the interval MRI report and was told by Dr Becki Schmid he did not need a biopsy.      Prostate MRI 10/21//21 [facility]): no evidence of clinically sig neoplasm.   Prostate MRI 12/11/18: No focal lesions suspicious for clinically significant cancer  Trus/Prostate biopsy 6/2012: neg, PV 31.5 cc  (PSA 7.19 ng/ml)  Trus/Prostate biopsy 11/2010: one focus of HGPIN, others neg   PCA3: neg, (10.7)    Past Medical History:   Diagnosis Date    GERD (gastroesophageal reflux disease)     PSA elevation      Past Surgical History:   Procedure Laterality Date    COLONOSCOPY      2011    COLONOSCOPY N/A 2/15/2021    COLORECTAL CANCER SCREENING with polypectomy performed by Jose Angel Horne MD at [address] [place] [address], DIAGNOSTIC      PROSTATE BIOPSY      x2    UPPER GASTROINTESTINAL ENDOSCOPY N/A 2/15/2021    EGD ESOPHAGOGASTRODUODENOSCOPY with biopsies performed by Jose Angel Horne MD at [address] [place] [address] History     Socioeconomic History    Marital status:      Spouse name: None    Number of children: None    Years of education: None    Highest education level: None   Occupational History    None   Tobacco Use    Smoking status: Never Smoker    Smokeless tobacco: Never Used   Vaping Use    Vaping Use: None   Substance and Sexual Activity    Alcohol use: Not Currently     Alcohol/week: 0.0 standard drinks    Drug use: No    Sexual activity: Yes     Partners: Female   Other Topics Concern    None   Social History Narrative    None     Social Determinants of Health     Financial Resource Strain:     Difficulty of Paying Living Expenses: Not on file   Food Insecurity:     Worried About Running Out of Food in the Last Year: Not on file    Lore of Food in the Last Year: Not on file   Transportation Needs:     Lack of Transportation (Medical): Not on file    Lack of Transportation (Non-Medical): Not on file   Physical Activity:     Days of Exercise per Week: Not on file    Minutes of Exercise per Session: Not on file   Stress:     Feeling of Stress : Not on file   Social Connections:     Frequency of Communication with Friends and Family: Not on file    Frequency of Social Gatherings with Friends and Family: Not on file    Attends Confucianism Services: Not on file    Active Member of Clubs or Organizations: Not on file    Attends Club or Organization Meetings: Not on file    Marital Status: Not on file   Intimate Partner Violence:     Fear of Current or Ex-Partner: Not on file    Emotionally Abused: Not on file    Physically Abused: Not on file    Sexually Abused: Not on file   Housing Stability:     Unable to Pay for Housing in the Last Year: Not on file    Number of [place] in the Last Year: Not on file    Unstable Housing in the Last Year: Not on file     Family History   Problem Relation Age of Onset    High Blood Pressure Mother     Cancer Mother         ovarian    High Blood Pressure Father     Emphysema Father     Cancer Father         prostate    Colon Cancer Neg Hx      Current Outpatient Medications   Medication Sig Dispense Refill    omeprazole (PRILOSEC) 20 MG delayed release capsule Take 1 capsule by mouth Daily 30 capsule 3    vitamin C (ASCORBIC ACID) 500 MG tablet Take 500 mg by mouth daily       No current facility-administered medications for this visit.      Bactrim [sulfamethoxazole-trimethoprim]  reviewed      Review of Systems   Constitutional: Negative for unexpected weight change. Respiratory: Negative for shortness of breath. Cardiovascular: Negative for chest pain. Gastrointestinal: Negative for abdominal distention and abdominal pain. Genitourinary: Negative for difficulty urinating, dysuria, flank pain and hematuria. Objective:   Physical Exam  Constitutional:       Appearance: Normal appearance. Genitourinary:     Prostate: Enlarged. Not tender and no nodules present. Rectum: No external hemorrhoid. Comments: Prostate is 3 +  Neurological:      Mental Status: He is alert. PSA   Date Value Ref Range Status   03/25/2022 8.26 (H) 0.00 - 4.00 ng/mL Final   09/22/2021 9.61 (H) 0.00 - 4.00 ng/mL Final   03/15/2021 8.09 (H) 0.00 - 6.22 ng/mL Final   09/14/2020 7.48 (H) 0.00 - 5.40 ng/mL Final     Comment:     When the Total PSA is between 3.00 and 10.00 ng/mL, consider  requesting a Free PSA to aid in diagnosis. 02/28/2020 6.59 (H) 0.00 - 5.40 ng/mL Final     Diagnostic Psa   Date Value Ref Range Status   07/30/2014 4.19 0.00 - 5.40 ng/mL Final   07/05/2013 4.3 (H) 0.0 - 4.0 ng/mL Final         Assessment: This is a 75 yo white male with h/o BPH with mild LUTs (stable) and h/o elevated PSA's (2 prior biopsies negative, PCA3 normal and negative MRI in 2018 and 2021) and with a persistently elevated and mproved PSA. Again, the option of repeat prostate biopsy vs MRI fusion biopsy was discussed and he wants to continue with closer PSA follow-up. Plan:      1.  F/U 6 mo for PSA        Bethany Rivera MD

## 2023-01-06 DIAGNOSIS — R97.20 ELEVATED PSA: Primary | ICD-10-CM

## 2023-01-09 DIAGNOSIS — R97.20 ELEVATED PSA: ICD-10-CM

## 2023-01-09 DIAGNOSIS — R97.20 ELEVATED PSA: Primary | ICD-10-CM

## 2023-01-13 ENCOUNTER — OFFICE VISIT (OUTPATIENT)
Dept: UROLOGY | Age: 73
End: 2023-01-13
Payer: MEDICARE

## 2023-01-13 VITALS
HEIGHT: 68 IN | SYSTOLIC BLOOD PRESSURE: 134 MMHG | DIASTOLIC BLOOD PRESSURE: 80 MMHG | HEART RATE: 64 BPM | WEIGHT: 160 LBS | BODY MASS INDEX: 24.25 KG/M2

## 2023-01-13 DIAGNOSIS — R97.20 ELEVATED PSA: Primary | ICD-10-CM

## 2023-01-13 PROCEDURE — 1123F ACP DISCUSS/DSCN MKR DOCD: CPT | Performed by: UROLOGY

## 2023-01-13 PROCEDURE — 99214 OFFICE O/P EST MOD 30 MIN: CPT | Performed by: UROLOGY

## 2023-01-13 ASSESSMENT — ENCOUNTER SYMPTOMS
ABDOMINAL PAIN: 0
ABDOMINAL DISTENTION: 0

## 2023-01-13 NOTE — PROGRESS NOTES
Subjective:      Patient ID: Candance Seton is a 71 y.o. male    HPI  This is a 72 yo white male with h/o BPH without LUTs and elevated PSA's and prior prostatitis (11/19 treated with Levaquin, stopped because of tendon pain) back in follow-up. Since last seen on 4/1/22, he has no hematuria or dysuria or pain. He has no voiding complaints. He has no new medical or surgical problems. I reviewed the interval PSA. He tried Flomax in the past and got dizzy and so stopped the medication. I reviewed the interval MRI report and was told by Dr Bryce German he did not need a biopsy. Prostate MRI 10/21//21 [facility]): no evidence of clinically sig neoplasm.   Prostate MRI 12/11/18: No focal lesions suspicious for clinically significant cancer  Trus/Prostate biopsy 6/2012: neg, PV 31.5 cc  (PSA 7.19 ng/ml)  Trus/Prostate biopsy 11/2010: one focus of HGPIN, others neg   PCA3: neg, (10.7)    Past Medical History:   Diagnosis Date    GERD (gastroesophageal reflux disease)     PSA elevation      Past Surgical History:   Procedure Laterality Date    COLONOSCOPY      2011    COLONOSCOPY N/A 2/15/2021    COLORECTAL CANCER SCREENING with polypectomy performed by Jess Carrasco MD at [address] [facility], DIAGNOSTIC      PROSTATE BIOPSY      x2    UPPER GASTROINTESTINAL ENDOSCOPY N/A 2/15/2021    EGD ESOPHAGOGASTRODUODENOSCOPY with biopsies performed by Jess Carrasco MD at [address] [place] [address] History    Marital status:      Spouse name: None    Number of children: None    Years of education: None    Highest education level: None   Tobacco Use    Smoking status: Never    Smokeless tobacco: Never   Substance and Sexual Activity    Alcohol use: Not Currently     Alcohol/week: 0.0 standard drinks    Drug use: No    Sexual activity: Yes     Partners: Female     Family History   Problem Relation Age of Onset    High Blood Pressure Mother     Cancer Mother         ovarian    High Blood Pressure Father     Emphysema Father     Cancer Father         prostate    Colon Cancer Neg Hx      Current Outpatient Medications   Medication Sig Dispense Refill    omeprazole (PRILOSEC) 20 MG delayed release capsule Take 1 capsule by mouth Daily 30 capsule 3    vitamin C (ASCORBIC ACID) 500 MG tablet Take 500 mg by mouth daily       No current facility-administered medications for this visit. Bactrim [sulfamethoxazole-trimethoprim]  reviewed      Review of Systems   Constitutional:  Negative for unexpected weight change. Gastrointestinal:  Negative for abdominal distention and abdominal pain. Genitourinary:  Negative for dysuria, flank pain, frequency and hematuria. Objective:   Physical Exam  Constitutional:       Appearance: Normal appearance. Genitourinary:     Prostate: Enlarged. Not tender and no nodules present. Rectum: No external hemorrhoid. Neurological:      Mental Status: He is alert. PSA   Date Value Ref Range Status   01/09/2023 10.46 (H) 0.00 - 4.00 ng/mL Final   03/25/2022 8.26 (H) 0.00 - 4.00 ng/mL Final   09/22/2021 9.61 (H) 0.00 - 4.00 ng/mL Final   03/15/2021 8.09 (H) 0.00 - 6.22 ng/mL Final   09/14/2020 7.48 (H) 0.00 - 5.40 ng/mL Final     Comment:     When the Total PSA is between 3.00 and 10.00 ng/mL, consider  requesting a Free PSA to aid in diagnosis. Diagnostic Psa   Date Value Ref Range Status   07/30/2014 4.19 0.00 - 5.40 ng/mL Final   07/05/2013 4.3 (H) 0.0 - 4.0 ng/mL Final       Assessment: This is a 70 yo white male with h/o BPH with mild LUTs (stable) and h/o elevated PSA's (2 prior biopsies negative, PCA3 normal and negative MRI in 2018 and 2021) and with a persistently elevated PSA that has again risen. Again, the option of repeat prostate biopsy vs MRI fusion biopsy vs [facility] urine testing was discussed and he wants the latter and would consider another MRI based on these results.  He understands the approx 50% chance for prostate cancer based on the current PSA value. He also wants to continue with closer PSA follow-up.        Plan:      [facility] and will call for results  F/U 3-4 mo for PSA        Gloria March MD

## 2023-01-25 ENCOUNTER — TELEPHONE (OUTPATIENT)
Dept: UROLOGY | Age: 73
End: 2023-01-25

## 2023-01-27 ENCOUNTER — TELEPHONE (OUTPATIENT)
Dept: UROLOGY | Age: 73
End: 2023-01-27

## 2023-02-21 LAB
CREATININE (MG/DL) IN SER/PLAS: 1.01 MG/DL (ref 0.5–1.3)
GFR MALE: 79 ML/MIN/1.73M2
UREA NITROGEN (MG/DL) IN SER/PLAS: 16 MG/DL (ref 6–23)

## 2023-04-21 DIAGNOSIS — R97.20 ELEVATED PSA: ICD-10-CM

## 2023-04-21 LAB — PSA SERPL-MCNC: 9.68 NG/ML (ref 0–4)

## 2023-04-26 ENCOUNTER — OFFICE VISIT (OUTPATIENT)
Dept: UROLOGY | Age: 73
End: 2023-04-26

## 2023-04-26 VITALS
WEIGHT: 160 LBS | HEART RATE: 66 BPM | HEIGHT: 68 IN | DIASTOLIC BLOOD PRESSURE: 78 MMHG | BODY MASS INDEX: 24.25 KG/M2 | SYSTOLIC BLOOD PRESSURE: 138 MMHG

## 2023-04-26 DIAGNOSIS — R97.20 ELEVATED PSA: Primary | ICD-10-CM

## 2023-04-26 ASSESSMENT — ENCOUNTER SYMPTOMS
ABDOMINAL PAIN: 0
ABDOMINAL DISTENTION: 0

## 2023-04-26 NOTE — PROGRESS NOTES
Subjective:      Patient ID: Sonu Patiño is a 71 y.o. male    HPI  This is a 72 yo white male with h/o BPH without LUTs and elevated PSA's and prior prostatitis (11/19 treated with Levaquin, stopped because of tendon pain) back in follow-up. Since last seen on 1/13/23, he has no hematuria or dysuria or pain. He has no voiding complaints. He has no new medical or surgical problems. I reviewed the interval PSA. He tried Flomax in the past and got dizzy and so stopped the medication. he had seen Dr Cecil Schmid again and had another MRI. I reviewed the interval MRI report from [facility] and was told by Dr Cecil Schmid he did not need a biopsy. Prostate MRI 3/2/23: PV 68, no evidence of clinically sig prostate cancer, no LN enlargement  Prostate MRI 10/21//[facility] [place] no evidence of clinically sig neoplasm.   Prostate MRI 12/11/18: No focal lesions suspicious for clinically significant cancer  Trus/Prostate biopsy 6/2012: neg, PV 31.5 cc  (PSA 7.19 ng/ml)    Past Medical History:   Diagnosis Date    GERD (gastroesophageal reflux disease)     PSA elevation      Past Surgical History:   Procedure Laterality Date    COLONOSCOPY      2011    COLONOSCOPY N/A 2/15/2021    COLORECTAL CANCER SCREENING with polypectomy performed by Sandie Martinez MD at [address] [place] [address], DIAGNOSTIC      PROSTATE BIOPSY      x2    UPPER GASTROINTESTINAL ENDOSCOPY N/A 2/15/2021    EGD ESOPHAGOGASTRODUODENOSCOPY with biopsies performed by Sandie Martinez MD at [place] [address] History    Marital status:      Spouse name: None    Number of children: None    Years of education: None    Highest education level: None   Tobacco Use    Smoking status: Never    Smokeless tobacco: Never   Substance and Sexual Activity    Alcohol use: Not Currently     Alcohol/week: 0.0 standard drinks    Drug use: No    Sexual activity: Yes     Partners: Female     Family History   Problem Relation Age of Onset    High

## 2023-11-28 DIAGNOSIS — R97.20 ELEVATED PSA: ICD-10-CM

## 2023-11-28 DIAGNOSIS — R97.20 ELEVATED PSA: Primary | ICD-10-CM

## 2023-11-28 LAB — PSA SERPL-MCNC: 8.03 NG/ML (ref 0–4)

## 2023-12-04 ENCOUNTER — OFFICE VISIT (OUTPATIENT)
Dept: UROLOGY | Age: 73
End: 2023-12-04
Payer: MEDICARE

## 2023-12-04 VITALS
DIASTOLIC BLOOD PRESSURE: 64 MMHG | SYSTOLIC BLOOD PRESSURE: 136 MMHG | WEIGHT: 165 LBS | BODY MASS INDEX: 25.01 KG/M2 | HEIGHT: 68 IN | HEART RATE: 66 BPM | OXYGEN SATURATION: 97 %

## 2023-12-04 DIAGNOSIS — R97.20 ELEVATED PSA: Primary | ICD-10-CM

## 2023-12-04 PROCEDURE — 1123F ACP DISCUSS/DSCN MKR DOCD: CPT | Performed by: UROLOGY

## 2023-12-04 PROCEDURE — 99213 OFFICE O/P EST LOW 20 MIN: CPT | Performed by: UROLOGY

## 2023-12-04 ASSESSMENT — ENCOUNTER SYMPTOMS
ABDOMINAL PAIN: 0
ABDOMINAL DISTENTION: 0

## 2023-12-04 NOTE — PROGRESS NOTES
Pressure Mother     Cancer Mother         ovarian    High Blood Pressure Father     Emphysema Father     Cancer Father         prostate    Colon Cancer Neg Hx      Current Outpatient Medications   Medication Sig Dispense Refill    omeprazole (PRILOSEC) 20 MG delayed release capsule Take 1 capsule by mouth Daily 30 capsule 3    vitamin C (ASCORBIC ACID) 500 MG tablet Take 1 tablet by mouth daily       No current facility-administered medications for this visit. Bactrim [sulfamethoxazole-trimethoprim]  reviewed      Review of Systems   Constitutional:  Negative for unexpected weight change. Gastrointestinal:  Negative for abdominal distention and abdominal pain. Genitourinary:  Negative for difficulty urinating, dysuria, flank pain and hematuria. Objective:   Physical Exam  Constitutional:       Appearance: Normal appearance. Genitourinary:     Prostate: Enlarged. Not tender and no nodules present. Rectum: No external hemorrhoid. Neurological:      Mental Status: He is alert. PSA   Date Value Ref Range Status   11/28/2023 8.03 (H) 0.00 - 4.00 ng/mL Final   04/21/2023 9.68 (H) 0.00 - 4.00 ng/mL Final   01/09/2023 10.46 (H) 0.00 - 4.00 ng/mL Final   03/25/2022 8.26 (H) 0.00 - 4.00 ng/mL Final   09/22/2021 9.61 (H) 0.00 - 4.00 ng/mL Final     Diagnostic Psa   Date Value Ref Range Status   07/30/2014 4.19 0.00 - 5.40 ng/mL Final   07/05/2013 4.3 (H) 0.0 - 4.0 ng/mL Final       Assessment: This is a 67 yo white male with h/o BPH with mild LUTs (stable) and h/o elevated PSA's (2 prior biopsies negative, PCA3 normal and negative MRI in 2018 and 2021) and with a persistently elevated PSA that has improved and MRI in 3/23 that shows no suspicious lesions. Again, the option of repeat prostate biopsy vs MRI fusion biopsy testing was discussed and he wants to continue with closer PSA follow-up. He understands the approx 25% chance for prostate cancer based on the current PSA value.

## 2024-02-09 NOTE — TELEPHONE ENCOUNTER
----- Message from Ashley Haque MD sent at 1/27/2023  2:58 PM EST -----  Regarding: RE: more questions regarding his diagnosis  Again discussed the results of [facility] over phone and answered all of his questions. He was concerned about the testing results suggesting possible higher risk for [place] 7 or higher cancer. I reassured him that an MRI is a reasonable approach and possible fusion biopsy. He will proceed with prostate MRI and visit with Dr Nayeli Ramos as scheduled in 2/23.   ----- Message -----  From: Primitivo Escobar  Sent: 1/27/2023  12:59 PM EST  To: Ashley Haque MD  Subject: more questions regarding his diagnosis           You spoke to this patient the other day regarding his results. .    He has more questions and wanted to know if you could call him at your convenience    [phone]
24

## 2024-04-02 ENCOUNTER — PREP FOR PROCEDURE (OUTPATIENT)
Dept: GASTROENTEROLOGY | Age: 74
End: 2024-04-02

## 2024-04-02 ENCOUNTER — OFFICE VISIT (OUTPATIENT)
Dept: GASTROENTEROLOGY | Age: 74
End: 2024-04-02
Payer: MEDICARE

## 2024-04-02 VITALS — HEART RATE: 62 BPM | WEIGHT: 166 LBS | HEIGHT: 68 IN | BODY MASS INDEX: 25.16 KG/M2 | OXYGEN SATURATION: 98 %

## 2024-04-02 DIAGNOSIS — K64.9 HEMORRHOIDS, UNSPECIFIED HEMORRHOID TYPE: ICD-10-CM

## 2024-04-02 DIAGNOSIS — Z86.010 HISTORY OF COLON POLYPS: ICD-10-CM

## 2024-04-02 DIAGNOSIS — K21.9 GASTROESOPHAGEAL REFLUX DISEASE, UNSPECIFIED WHETHER ESOPHAGITIS PRESENT: ICD-10-CM

## 2024-04-02 DIAGNOSIS — K62.89 RECTAL PAIN: Primary | ICD-10-CM

## 2024-04-02 DIAGNOSIS — K62.89 RECTAL PAIN: ICD-10-CM

## 2024-04-02 PROBLEM — Z86.0100 HISTORY OF COLON POLYPS: Status: ACTIVE | Noted: 2024-04-02

## 2024-04-02 PROCEDURE — 99203 OFFICE O/P NEW LOW 30 MIN: CPT | Performed by: INTERNAL MEDICINE

## 2024-04-02 PROCEDURE — 1123F ACP DISCUSS/DSCN MKR DOCD: CPT | Performed by: INTERNAL MEDICINE

## 2024-04-02 RX ORDER — SODIUM CHLORIDE 0.9 % (FLUSH) 0.9 %
5-40 SYRINGE (ML) INJECTION EVERY 12 HOURS SCHEDULED
Status: CANCELLED | OUTPATIENT
Start: 2024-04-02

## 2024-04-02 RX ORDER — SODIUM CHLORIDE 0.9 % (FLUSH) 0.9 %
5-40 SYRINGE (ML) INJECTION PRN
Status: CANCELLED | OUTPATIENT
Start: 2024-04-02

## 2024-04-02 RX ORDER — SODIUM CHLORIDE 9 MG/ML
INJECTION, SOLUTION INTRAVENOUS PRN
Status: CANCELLED | OUTPATIENT
Start: 2024-04-02

## 2024-04-02 RX ORDER — SODIUM CHLORIDE 9 MG/ML
INJECTION, SOLUTION INTRAVENOUS CONTINUOUS
Status: CANCELLED | OUTPATIENT
Start: 2024-04-02

## 2024-04-02 NOTE — PROGRESS NOTES
in ensuring a good exam was emphasized.    4. Gastroesophageal reflux disease, unspecified whether esophagitis present  -Antireflux measures, continue with omeprazole at 20 mg once daily, recommend daily use    Postprocedure follow-up after completion of colonoscopy in 2 weeks    Benji Miles MD   Staff Gastroenterologist  [facility]    Please note this report has been partially produced using speech recognition software and may cause or contain errors related to thatsystem including grammar, punctuation and spelling as well as words and phrases that may seem inappropriate. If there are questions or concerns please feel free to contact me to clarify.

## 2024-04-05 ENCOUNTER — ANESTHESIA EVENT (OUTPATIENT)
Dept: ENDOSCOPY | Age: 74
End: 2024-04-05
Payer: MEDICARE

## 2024-04-05 ENCOUNTER — HOSPITAL ENCOUNTER (OUTPATIENT)
Age: 74
Setting detail: OUTPATIENT SURGERY
Discharge: HOME | End: 2024-04-05
Attending: INTERNAL MEDICINE | Admitting: INTERNAL MEDICINE
Payer: MEDICARE

## 2024-04-05 ENCOUNTER — ANESTHESIA (OUTPATIENT)
Dept: ENDOSCOPY | Age: 74
End: 2024-04-05
Payer: MEDICARE

## 2024-04-05 VITALS
RESPIRATION RATE: 18 BRPM | SYSTOLIC BLOOD PRESSURE: 161 MMHG | OXYGEN SATURATION: 97 % | WEIGHT: 160 LBS | BODY MASS INDEX: 24.25 KG/M2 | TEMPERATURE: 97.3 F | HEART RATE: 64 BPM | DIASTOLIC BLOOD PRESSURE: 80 MMHG | HEIGHT: 68 IN

## 2024-04-05 DIAGNOSIS — K64.9 HEMORRHOIDS: ICD-10-CM

## 2024-04-05 DIAGNOSIS — Z86.010 HISTORY OF COLON POLYPS: ICD-10-CM

## 2024-04-05 DIAGNOSIS — K62.89 RECTAL PAIN: ICD-10-CM

## 2024-04-05 PROCEDURE — 3700000000 HC ANESTHESIA ATTENDED CARE: Performed by: INTERNAL MEDICINE

## 2024-04-05 PROCEDURE — 3609027000 HC COLONOSCOPY: Performed by: INTERNAL MEDICINE

## 2024-04-05 PROCEDURE — 45385 COLONOSCOPY W/LESION REMOVAL: CPT | Performed by: INTERNAL MEDICINE

## 2024-04-05 PROCEDURE — 2580000003 HC RX 258

## 2024-04-05 PROCEDURE — 3700000001 HC ADD 15 MINUTES (ANESTHESIA): Performed by: INTERNAL MEDICINE

## 2024-04-05 PROCEDURE — 6360000002 HC RX W HCPCS: Performed by: NURSE ANESTHETIST, CERTIFIED REGISTERED

## 2024-04-05 PROCEDURE — 7100000011 HC PHASE II RECOVERY - ADDTL 15 MIN: Performed by: INTERNAL MEDICINE

## 2024-04-05 PROCEDURE — 7100000010 HC PHASE II RECOVERY - FIRST 15 MIN: Performed by: INTERNAL MEDICINE

## 2024-04-05 PROCEDURE — 88305 TISSUE EXAM BY PATHOLOGIST: CPT

## 2024-04-05 PROCEDURE — 2580000003 HC RX 258: Performed by: INTERNAL MEDICINE

## 2024-04-05 PROCEDURE — 2709999900 HC NON-CHARGEABLE SUPPLY: Performed by: INTERNAL MEDICINE

## 2024-04-05 PROCEDURE — 6370000000 HC RX 637 (ALT 250 FOR IP): Performed by: INTERNAL MEDICINE

## 2024-04-05 RX ORDER — SODIUM CHLORIDE 0.9 % (FLUSH) 0.9 %
5-40 SYRINGE (ML) INJECTION EVERY 12 HOURS SCHEDULED
Status: DISCONTINUED | OUTPATIENT
Start: 2024-04-05 | End: 2024-04-05 | Stop reason: HOSPADM

## 2024-04-05 RX ORDER — SODIUM CHLORIDE 9 MG/ML
INJECTION, SOLUTION INTRAVENOUS CONTINUOUS
Status: DISCONTINUED | OUTPATIENT
Start: 2024-04-05 | End: 2024-04-05 | Stop reason: HOSPADM

## 2024-04-05 RX ORDER — SIMETHICONE 40MG/0.6ML
SUSPENSION, DROPS(FINAL DOSAGE FORM)(ML) ORAL PRN
Status: DISCONTINUED | OUTPATIENT
Start: 2024-04-05 | End: 2024-04-05 | Stop reason: ALTCHOICE

## 2024-04-05 RX ORDER — SODIUM CHLORIDE 9 MG/ML
INJECTION, SOLUTION INTRAVENOUS
Status: COMPLETED
Start: 2024-04-05 | End: 2024-04-05

## 2024-04-05 RX ORDER — MAGNESIUM HYDROXIDE 1200 MG/15ML
LIQUID ORAL PRN
Status: DISCONTINUED | OUTPATIENT
Start: 2024-04-05 | End: 2024-04-05 | Stop reason: ALTCHOICE

## 2024-04-05 RX ORDER — SODIUM CHLORIDE 9 MG/ML
INJECTION, SOLUTION INTRAVENOUS PRN
Status: DISCONTINUED | OUTPATIENT
Start: 2024-04-05 | End: 2024-04-05 | Stop reason: HOSPADM

## 2024-04-05 RX ORDER — PROPOFOL 10 MG/ML
INJECTION, EMULSION INTRAVENOUS CONTINUOUS PRN
Status: DISCONTINUED | OUTPATIENT
Start: 2024-04-05 | End: 2024-04-05 | Stop reason: SDUPTHER

## 2024-04-05 RX ORDER — SODIUM CHLORIDE 0.9 % (FLUSH) 0.9 %
5-40 SYRINGE (ML) INJECTION PRN
Status: DISCONTINUED | OUTPATIENT
Start: 2024-04-05 | End: 2024-04-05 | Stop reason: HOSPADM

## 2024-04-05 RX ORDER — SIMETHICONE 40MG/0.6ML
SUSPENSION, DROPS(FINAL DOSAGE FORM)(ML) ORAL
Status: DISCONTINUED
Start: 2024-04-05 | End: 2024-04-05 | Stop reason: HOSPADM

## 2024-04-05 RX ADMIN — SODIUM CHLORIDE: 9 INJECTION, SOLUTION INTRAVENOUS at 07:41

## 2024-04-05 RX ADMIN — PROPOFOL 180 MCG/KG/MIN: 10 INJECTION, EMULSION INTRAVENOUS at 07:44

## 2024-04-05 ASSESSMENT — PAIN - FUNCTIONAL ASSESSMENT
PAIN_FUNCTIONAL_ASSESSMENT: 0-10
PAIN_FUNCTIONAL_ASSESSMENT: 0-10

## 2024-04-05 NOTE — ANESTHESIA POSTPROCEDURE EVALUATION
Department of Anesthesiology  Postprocedure Note    Patient: Ethan Hale  MRN: [id number]  YOB: 1950  Date of evaluation: 4/5/2024    Procedure Summary       Date: 04/05/24 Room / Location: [facility] OR 02 / [facility]    Anesthesia Start: 0740 Anesthesia Stop: 0801    Procedure: COLONOSCOPY DIAGNOSTIC with polypectomy Diagnosis:       Rectal pain      Hemorrhoids      History of colon polyps      (Rectal pain [K62.89])      (Hemorrhoids [K64.9])      (History of colon polyps [Z86.010])    Surgeons: Benji Miles MD Responsible Provider: Silva Santillan APRN - CRNA    Anesthesia Type: MAC ASA Status: 2            Anesthesia Type: No value filed.    Taina Phase I: Taina Score: 10    Taina Phase II:      Anesthesia Post Evaluation    Patient location during evaluation: bedside  Patient participation: complete - patient participated  Level of consciousness: awake and alert  Pain score: 0  Airway patency: patent  Nausea & Vomiting: no nausea and no vomiting  Cardiovascular status: blood pressure returned to baseline and hemodynamically stable  Respiratory status: acceptable, spontaneous ventilation, nonlabored ventilation and nasal cannula  Hydration status: euvolemic  Pain management: adequate        No notable events documented.

## 2024-04-05 NOTE — ANESTHESIA PRE PROCEDURE
Department of Anesthesiology  Preprocedure Note       Name:  Ethan Hale   Age:  73 y.o.  :  1950                                          MRN:  [id number]         Date:  2024      Surgeon: Surgeon(s):  Benji Miles MD    Procedure: Procedure(s):  COLONOSCOPY DIAGNOSTIC    Medications prior to admission:   Prior to Admission medications    Medication Sig Start Date End Date Taking? Authorizing Provider   omeprazole (PRILOSEC) 20 MG delayed release capsule Take 1 capsule by mouth Daily 19   Benji Miles MD   vitamin C (ASCORBIC ACID) 500 MG tablet Take 1 tablet by mouth daily    Provider, MD Marsha       Current medications:    Current Facility-Administered Medications   Medication Dose Route Frequency Provider Last Rate Last Admin    0.9 % sodium chloride infusion   IntraVENous Continuous Benji Miles MD        sodium chloride flush 0.9 % injection 5-40 mL  5-40 mL IntraVENous 2 times per day Benji Miles MD        sodium chloride flush 0.9 % injection 5-40 mL  5-40 mL IntraVENous PRN Benji Miles MD        0.9 % sodium chloride infusion   IntraVENous PRN Benji Miles MD        sodium chloride 0.9 % infusion             simethicone (MYLICON) 40 MG/0.6ML suspension drops                Allergies:    Allergies   Allergen Reactions    Bactrim [Sulfamethoxazole-Trimethoprim] Hives     Patient stated got hives, itching after taking Bactrim       Problem List:    Patient Active Problem List   Diagnosis Code    Dysuria R30.0    Elevated PSA R97.20    Hyperlipidemia with target LDL less than 130 E78.5    Prostatitis, chronic N41.1    Rectal pain K62.89    Hemorrhoids K64.9    History of colon polyps Z86.010       Past Medical History:        Diagnosis Date    GERD (gastroesophageal reflux disease)     PSA elevation        Past Surgical History:        Procedure Laterality Date    COLONOSCOPY          COLONOSCOPY N/A 2/15/2021    COLORECTAL CANCER SCREENING with polypectomy performed by Benji WASHINGTON

## 2024-04-05 NOTE — H&P
Patient Name: Ethan Hale  : 1950  MRN: [id number]  DATE: 24      ENDOSCOPY  History and Physical    Procedure:    [] Diagnostic Colonoscopy       [x] Screening Colonoscopy  [] EGD      [] ERCP      [] EUS       [] Other    [x] Previous office notes/History and Physical reviewed from the patients chart. Please see EMR for further details of HPI. I have examined the patient's status immediately prior to the procedure and:      Indications/HPI:    []Abdominal Pain   []Cancer- GI/Lung  []Fhx of colon CA  []History of Polyps   []Foote’s   []Melena  []Abnormal Imaging   []Dysphagia    []Persistent Pneumonia  []Anemia   []Food Impaction  [x]History of Polyps  []GI Bleed   []Pulmonary nodule/Mass  []Change in bowel habits  []Heartburn/Reflux  []Rectal Bleed (BRBPR)  []Chest Pain - Non Cardiac  []Heme (+) Stool  []Ulcers  []Constipation   []Hemoptysis   []Varices  []Diarrhea   []Hypoxemia  []Nausea/Vomiting   []Screening   []Crohns/Colitis  []Other:    Anesthesia:   [x] MAC [] Moderate Sedation   [] General   [] None     ROS: 12 pt Review of Symptoms was negative unless mentioned above    Medications:   Prior to Admission medications    Medication Sig Start Date End Date Taking? Authorizing Provider   omeprazole (PRILOSEC) 20 MG delayed release capsule Take 1 capsule by mouth Daily 19   Benji Miles MD   vitamin C (ASCORBIC ACID) 500 MG tablet Take 1 tablet by mouth daily    ProviderMarsha MD     Allergies:   Allergies   Allergen Reactions    Bactrim [Sulfamethoxazole-Trimethoprim] Hives     Patient stated got hives, itching after taking Bactrim      History of allergic reaction to anesthesia:  No  Past Medical History:  Past Medical History:   Diagnosis Date    GERD (gastroesophageal reflux disease)     PSA elevation      Past Surgical History:  Past Surgical History:   Procedure Laterality Date    COLONOSCOPY          COLONOSCOPY N/A 2/15/2021    COLORECTAL CANCER SCREENING with polypectomy

## 2024-05-01 ENCOUNTER — OFFICE VISIT (OUTPATIENT)
Dept: GASTROENTEROLOGY | Age: 74
End: 2024-05-01
Payer: MEDICARE

## 2024-05-01 VITALS — HEART RATE: 61 BPM | WEIGHT: 164 LBS | HEIGHT: 68 IN | BODY MASS INDEX: 24.86 KG/M2 | OXYGEN SATURATION: 98 %

## 2024-05-01 DIAGNOSIS — Z86.010 HISTORY OF COLON POLYPS: ICD-10-CM

## 2024-05-01 DIAGNOSIS — K21.9 GASTROESOPHAGEAL REFLUX DISEASE, UNSPECIFIED WHETHER ESOPHAGITIS PRESENT: Primary | ICD-10-CM

## 2024-05-01 DIAGNOSIS — K64.9 HEMORRHOIDS, UNSPECIFIED HEMORRHOID TYPE: ICD-10-CM

## 2024-05-01 PROCEDURE — 99213 OFFICE O/P EST LOW 20 MIN: CPT | Performed by: NURSE PRACTITIONER

## 2024-05-01 PROCEDURE — 1123F ACP DISCUSS/DSCN MKR DOCD: CPT | Performed by: NURSE PRACTITIONER

## 2024-05-01 NOTE — PROGRESS NOTES
Behavior normal.         Thought Content: Thought content normal.         Judgment: Judgment normal.         Laboratory, Pathology, Radiology reviewed in detail with relevantimportant investigations summarized below:    No results for input(s): \"WBC\", \"HGB\", \"HCT\", \"MCV\", \"PLT\" in the last 720 hours.   Lab Results   Component Value Date    ALT 17 08/13/2019    AST 23 08/13/2019    ALKPHOS 73 08/13/2019    BILITOT 0.5 08/13/2019        Assessment and Plan:  Ethan Hale 73 y.o. male Patient is to GI clinic for follow-up from colonoscopy 4/5/2024, noted 3 mm polyp in cecum and external hemorrhoids otherwise normal exam.  Patient denies GERD symptoms with daily Prilosec OTC.  Denies any unintentional weight loss, dysphagia, hematemesis, hematochezia nor melena.    1. Gastroesophageal reflux disease, unspecified whether esophagitis present  -Continue Prilosec OTC 20 mg daily   - Advised on the correct dose and timing of the PPI, 20 to 30 min before breakfast   - Pathophysiology and etiology of reflux discussed at length  - Lifestyle modification recommended and discussed with the patient   --Avoid spicy and acidic based foods   --Limit coffee, tea, alcohol use   --Limit the amount of food during meal time   --Avoid bedtime snacks and eat meals 3 to 4 hrs before lying down   --Elevate the head of the bed    --Keep healthy weight    2. History of colon polyps  3. Hemorrhoids, unspecified hemorrhoid type  Constipation  -Discussed with patient at length recommendation to increase fluid, fiber intake and physical activity.    -May consider fiber (ex. Metamucil 3.4 g by mouth daily with a glass of water or juice) titrate up to 2-3 times per day as needed.  -Health Maintenance Surveillance colonoscopy 4/2031    Return if symptoms worsen or fail to improve.    Kelly J Slavik-Bosworth, APRN - CNP   Staff Gastroenterology Nurse Practitioner  [facility]    Please note this report has been partially produced

## 2024-06-10 DIAGNOSIS — R97.20 ELEVATED PSA: ICD-10-CM

## 2024-06-10 DIAGNOSIS — R97.20 ELEVATED PSA: Primary | ICD-10-CM

## 2024-06-10 LAB — PSA SERPL-MCNC: 12.4 NG/ML (ref 0–4)

## 2024-06-17 ENCOUNTER — OFFICE VISIT (OUTPATIENT)
Dept: UROLOGY | Age: 74
End: 2024-06-17
Payer: MEDICARE

## 2024-06-17 VITALS
WEIGHT: 160 LBS | HEART RATE: 70 BPM | HEIGHT: 68 IN | DIASTOLIC BLOOD PRESSURE: 80 MMHG | BODY MASS INDEX: 24.25 KG/M2 | SYSTOLIC BLOOD PRESSURE: 138 MMHG

## 2024-06-17 DIAGNOSIS — R97.20 ELEVATED PSA: Primary | ICD-10-CM

## 2024-06-17 PROCEDURE — 1123F ACP DISCUSS/DSCN MKR DOCD: CPT | Performed by: UROLOGY

## 2024-06-17 PROCEDURE — 99214 OFFICE O/P EST MOD 30 MIN: CPT | Performed by: UROLOGY

## 2024-06-17 RX ORDER — CIPROFLOXACIN 500 MG/1
500 TABLET, FILM COATED ORAL 2 TIMES DAILY
Qty: 28 TABLET | Refills: 2 | Status: SHIPPED | OUTPATIENT
Start: 2024-06-17

## 2024-06-17 ASSESSMENT — ENCOUNTER SYMPTOMS
ABDOMINAL PAIN: 0
ABDOMINAL DISTENTION: 0

## 2024-06-17 NOTE — PROGRESS NOTES
03/25/2022 8.26 (H) 0.00 - 4.00 ng/mL Final         Assessment:      This is a 74 yo white male with h/o BPH with mild LUTs (stable) and h/o elevated PSA's (2 prior biopsies negative, PCA3 normal and negative MRI in 2018 and 2021) and with a persistently elevated PSA that has risen but may be related to prostatitis and MRI in 3/23 that shows no suspicious lesions. Will start Cipro for 28 days (he has 2 weeks at home). The proper dosing and SE of Cipro were reviewed including but not limited to rash if in sun, tendon rupture, CDiff and need to use pro-biotic, neuropathy and Irregular HR and patient wants to proceed. Will re-check PSA in 3 mo.       Plan:      F/U 3 mo for PSA  Orders Placed This Encounter   Procedures    PSA, Diagnostic     Standing Status:   Future     Standing Expiration Date:   9/17/2024     Orders Placed This Encounter   Medications    ciprofloxacin (CIPRO) 500 MG tablet     Sig: Take 1 tablet by mouth 2 times daily     Dispense:  28 tablet     Refill:  2             Gage Pollard MD

## 2024-09-05 DIAGNOSIS — R97.20 ELEVATED PSA: ICD-10-CM

## 2024-09-05 LAB — PSA SERPL-MCNC: 9.35 NG/ML (ref 0–4)

## 2024-09-18 ENCOUNTER — OFFICE VISIT (OUTPATIENT)
Dept: UROLOGY | Age: 74
End: 2024-09-18
Payer: MEDICARE

## 2024-09-18 VITALS
HEIGHT: 68 IN | WEIGHT: 160 LBS | HEART RATE: 91 BPM | BODY MASS INDEX: 24.25 KG/M2 | SYSTOLIC BLOOD PRESSURE: 136 MMHG | DIASTOLIC BLOOD PRESSURE: 68 MMHG

## 2024-09-18 DIAGNOSIS — R97.20 ELEVATED PSA: Primary | ICD-10-CM

## 2024-09-18 PROCEDURE — 1123F ACP DISCUSS/DSCN MKR DOCD: CPT | Performed by: UROLOGY

## 2024-09-18 PROCEDURE — 99213 OFFICE O/P EST LOW 20 MIN: CPT | Performed by: UROLOGY

## 2024-09-18 ASSESSMENT — ENCOUNTER SYMPTOMS
ABDOMINAL PAIN: 0
ABDOMINAL DISTENTION: 0

## 2025-03-07 DIAGNOSIS — R97.20 ELEVATED PSA: ICD-10-CM

## 2025-03-07 LAB — PSA SERPL-MCNC: 9.5 NG/ML (ref 0–4)

## 2025-03-18 ENCOUNTER — OFFICE VISIT (OUTPATIENT)
Dept: UROLOGY | Age: 75
End: 2025-03-18
Payer: MEDICARE

## 2025-03-18 VITALS
HEIGHT: 68 IN | SYSTOLIC BLOOD PRESSURE: 160 MMHG | HEART RATE: 72 BPM | WEIGHT: 160 LBS | DIASTOLIC BLOOD PRESSURE: 82 MMHG | BODY MASS INDEX: 24.25 KG/M2

## 2025-03-18 DIAGNOSIS — R97.20 ELEVATED PSA: Primary | ICD-10-CM

## 2025-03-18 PROCEDURE — 1159F MED LIST DOCD IN RCRD: CPT | Performed by: UROLOGY

## 2025-03-18 PROCEDURE — 1160F RVW MEDS BY RX/DR IN RCRD: CPT | Performed by: UROLOGY

## 2025-03-18 PROCEDURE — 99213 OFFICE O/P EST LOW 20 MIN: CPT | Performed by: UROLOGY

## 2025-03-18 PROCEDURE — 1123F ACP DISCUSS/DSCN MKR DOCD: CPT | Performed by: UROLOGY

## 2025-03-18 ASSESSMENT — ENCOUNTER SYMPTOMS: ABDOMINAL PAIN: 0

## 2025-03-18 NOTE — PROGRESS NOTES
Subjective:      Patient ID: Ethan Hale is a 74 y.o. male    HPI  This is a 73 yo white male with h/o BPH without LUTs and elevated PSA's and prior prostatitis (11/19 treated with Levaquin, stopped because of tendon pain) back in follow-up. He took Cipro once since last seen for prostatitis a few months ago. He used 7-10 days and the symptoms resolved. He has no current bothersome LUTs and I reviewed the interval PSA. He has no interval medical or surgical problems.  He tried Flomax in the past and got dizzy and so stopped the medication. He had seen Dr Boyle in 2023 and had another MR and was told by Dr Boyle he did not need a biopsy.     Prostate MRI 3/2/23: PV 68, no evidence of clinically sig prostate cancer, no LN enlargement  Prostate MRI 10/21//21 (): no evidence of clinically sig neoplasm.  Prostate MRI 12/11/18: No focal lesions suspicious for clinically significant cancer  Trus/Prostate biopsy 6/2012: neg, PV 31.5 cc  (PSA 7.19 ng/ml)    Past Medical History:   Diagnosis Date    GERD (gastroesophageal reflux disease)     PSA elevation      Past Surgical History:   Procedure Laterality Date    COLONOSCOPY      2011    COLONOSCOPY N/A 2/15/2021    COLORECTAL CANCER SCREENING with polypectomy performed by Benji Miles MD at [facility]    COLONOSCOPY N/A 4/5/2024    COLONOSCOPY DIAGNOSTIC with polypectomy performed by Benji Miles MD at [facility]    ENDOSCOPY, COLON, DIAGNOSTIC      PROSTATE BIOPSY      x2    UPPER GASTROINTESTINAL ENDOSCOPY N/A 2/15/2021    EGD ESOPHAGOGASTRODUODENOSCOPY with biopsies performed by Benji Miles MD at [facility]     Social History     Socioeconomic History    Marital status:    Tobacco Use    Smoking status: Never    Smokeless tobacco: Never   Substance and Sexual Activity    Alcohol use: Not Currently     Alcohol/week: 3.0 - 4.0 standard drinks of alcohol     Types: 3 - 4 Standard drinks or equivalent per week     Comment: ON THE

## (undated) DEVICE — SNARE ENDOSCP M L240CM W27MM SHTH DIA2.4MM CHN 2.8MM HEX

## (undated) DEVICE — ADAPTER FLSH PMP FLD MGMT GI IRRIG OFP 2 DISPOSABLE

## (undated) DEVICE — CONTRAST AGNT 10ML GI TRACT SUBMUCOSAL INJ FOR REM OF POLYP

## (undated) DEVICE — TRAP POLYP BALEEN

## (undated) DEVICE — SNARE SURG 9 MM 2.4 MMX230 CM EXACTO CLD

## (undated) DEVICE — SINGLE PORT MANIFOLD: Brand: NEPTUNE 2

## (undated) DEVICE — FORCEPS ENDOSCP BX OVL CUP SERR W/NEEDLE 2.3MM DIA 160CML

## (undated) DEVICE — CONMED SCOPE SAVER BITE BLOCK, 20X27 MM: Brand: SCOPE SAVER

## (undated) DEVICE — Device: Brand: ENDO SMARTCAP

## (undated) DEVICE — BRUSH ENDO CLN L90.5IN SHTH DIA1.7MM BRIST DIA5-7MM 2-6MM

## (undated) DEVICE — TUBING, SUCTION, 1/4" X 10', STRAIGHT: Brand: MEDLINE

## (undated) DEVICE — CATHETER SCLERO L240CM NDL 25GA L4MM SHTH DIA2.3MM CNTRST

## (undated) DEVICE — TUBE SET 96 MM 64 MM H2O PERISTALTIC STD AUX CHANNEL

## (undated) DEVICE — ENDO CARRY-ON PROCEDURE KIT: Brand: ENDO CARRY-ON PROCEDURE KIT

## (undated) DEVICE — ELECTRODE PT RET AD L9FT HI MOIST COND ADH HYDRGEL CORDED